# Patient Record
Sex: FEMALE | Race: BLACK OR AFRICAN AMERICAN | Employment: OTHER | URBAN - METROPOLITAN AREA
[De-identification: names, ages, dates, MRNs, and addresses within clinical notes are randomized per-mention and may not be internally consistent; named-entity substitution may affect disease eponyms.]

---

## 2017-06-19 ENCOUNTER — TELEPHONE (OUTPATIENT)
Dept: SURGERY | Age: 47
End: 2017-06-19

## 2018-02-18 ENCOUNTER — APPOINTMENT (OUTPATIENT)
Dept: GENERAL RADIOLOGY | Age: 48
End: 2018-02-18
Attending: EMERGENCY MEDICINE
Payer: MEDICAID

## 2018-02-18 ENCOUNTER — HOSPITAL ENCOUNTER (EMERGENCY)
Age: 48
Discharge: HOME | End: 2018-02-19
Attending: EMERGENCY MEDICINE
Payer: MEDICAID

## 2018-02-18 VITALS
DIASTOLIC BLOOD PRESSURE: 93 MMHG | RESPIRATION RATE: 18 BRPM | SYSTOLIC BLOOD PRESSURE: 148 MMHG | TEMPERATURE: 98.9 F | BODY MASS INDEX: 23.64 KG/M2 | WEIGHT: 156 LBS | OXYGEN SATURATION: 99 % | HEIGHT: 68 IN | HEART RATE: 94 BPM

## 2018-02-18 DIAGNOSIS — M54.10 RADICULOPATHY, UNSPECIFIED SPINAL REGION: ICD-10-CM

## 2018-02-18 DIAGNOSIS — M79.602 PAIN OF LEFT UPPER EXTREMITY: ICD-10-CM

## 2018-02-18 DIAGNOSIS — I10 HYPERTENSION, UNSPECIFIED TYPE: ICD-10-CM

## 2018-02-18 DIAGNOSIS — K21.00 GASTROESOPHAGEAL REFLUX DISEASE WITH ESOPHAGITIS: Primary | ICD-10-CM

## 2018-02-18 LAB
ALBUMIN SERPL-MCNC: 3.2 G/DL (ref 3.5–5)
ALBUMIN/GLOB SERPL: 0.9 {RATIO} (ref 1.1–2.2)
ALP SERPL-CCNC: 124 U/L (ref 45–117)
ALT SERPL-CCNC: 31 U/L (ref 12–78)
ANION GAP SERPL CALC-SCNC: 11 MMOL/L (ref 5–15)
AST SERPL-CCNC: 23 U/L (ref 15–37)
BASOPHILS # BLD: 0 K/UL (ref 0–0.1)
BASOPHILS NFR BLD: 0 % (ref 0–1)
BILIRUB SERPL-MCNC: 0.2 MG/DL (ref 0.2–1)
BUN SERPL-MCNC: 9 MG/DL (ref 6–20)
BUN/CREAT SERPL: 11 (ref 12–20)
CALCIUM SERPL-MCNC: 8.6 MG/DL (ref 8.5–10.1)
CHLORIDE SERPL-SCNC: 108 MMOL/L (ref 97–108)
CO2 SERPL-SCNC: 25 MMOL/L (ref 21–32)
CREAT SERPL-MCNC: 0.85 MG/DL (ref 0.55–1.02)
D DIMER PPP FEU-MCNC: 1.79 MG/L FEU (ref 0–0.65)
DIFFERENTIAL METHOD BLD: ABNORMAL
EOSINOPHIL # BLD: 0.3 K/UL (ref 0–0.4)
EOSINOPHIL NFR BLD: 5 % (ref 0–7)
ERYTHROCYTE [DISTWIDTH] IN BLOOD BY AUTOMATED COUNT: 13.3 % (ref 11.5–14.5)
GLOBULIN SER CALC-MCNC: 3.4 G/DL (ref 2–4)
GLUCOSE SERPL-MCNC: 123 MG/DL (ref 65–100)
HCT VFR BLD AUTO: 33.9 % (ref 35–47)
HGB BLD-MCNC: 11.2 G/DL (ref 11.5–16)
IMM GRANULOCYTES # BLD: 0 K/UL (ref 0–0.04)
IMM GRANULOCYTES NFR BLD AUTO: 0 % (ref 0–0.5)
LIPASE SERPL-CCNC: 254 U/L (ref 73–393)
LYMPHOCYTES # BLD: 1.9 K/UL (ref 0.8–3.5)
LYMPHOCYTES NFR BLD: 36 % (ref 12–49)
MCH RBC QN AUTO: 29.9 PG (ref 26–34)
MCHC RBC AUTO-ENTMCNC: 33 G/DL (ref 30–36.5)
MCV RBC AUTO: 90.4 FL (ref 80–99)
MONOCYTES # BLD: 0.5 K/UL (ref 0–1)
MONOCYTES NFR BLD: 9 % (ref 5–13)
NEUTS SEG # BLD: 2.5 K/UL (ref 1.8–8)
NEUTS SEG NFR BLD: 49 % (ref 32–75)
NRBC # BLD: 0 K/UL (ref 0–0.01)
NRBC BLD-RTO: 0 PER 100 WBC
PLATELET # BLD AUTO: 300 K/UL (ref 150–400)
PMV BLD AUTO: 9.5 FL (ref 8.9–12.9)
POTASSIUM SERPL-SCNC: 3.3 MMOL/L (ref 3.5–5.1)
PROT SERPL-MCNC: 6.6 G/DL (ref 6.4–8.2)
RBC # BLD AUTO: 3.75 M/UL (ref 3.8–5.2)
SODIUM SERPL-SCNC: 144 MMOL/L (ref 136–145)
TROPONIN I SERPL-MCNC: <0.04 NG/ML
WBC # BLD AUTO: 5.2 K/UL (ref 3.6–11)

## 2018-02-18 PROCEDURE — 84484 ASSAY OF TROPONIN QUANT: CPT | Performed by: EMERGENCY MEDICINE

## 2018-02-18 PROCEDURE — A4565 SLINGS: HCPCS

## 2018-02-18 PROCEDURE — 85025 COMPLETE CBC W/AUTO DIFF WBC: CPT | Performed by: EMERGENCY MEDICINE

## 2018-02-18 PROCEDURE — 36415 COLL VENOUS BLD VENIPUNCTURE: CPT | Performed by: EMERGENCY MEDICINE

## 2018-02-18 PROCEDURE — 93005 ELECTROCARDIOGRAM TRACING: CPT

## 2018-02-18 PROCEDURE — 74011250637 HC RX REV CODE- 250/637: Performed by: EMERGENCY MEDICINE

## 2018-02-18 PROCEDURE — 71046 X-RAY EXAM CHEST 2 VIEWS: CPT

## 2018-02-18 PROCEDURE — 99283 EMERGENCY DEPT VISIT LOW MDM: CPT

## 2018-02-18 PROCEDURE — 83690 ASSAY OF LIPASE: CPT | Performed by: EMERGENCY MEDICINE

## 2018-02-18 PROCEDURE — 80053 COMPREHEN METABOLIC PANEL: CPT | Performed by: EMERGENCY MEDICINE

## 2018-02-18 PROCEDURE — 74011000250 HC RX REV CODE- 250: Performed by: EMERGENCY MEDICINE

## 2018-02-18 PROCEDURE — 85379 FIBRIN DEGRADATION QUANT: CPT | Performed by: EMERGENCY MEDICINE

## 2018-02-18 RX ADMIN — PHENOBARBITAL ELIXIR 50 ML: 16.2; .1037; .0065; .0194 ELIXIR ORAL at 22:58

## 2018-02-19 ENCOUNTER — APPOINTMENT (OUTPATIENT)
Dept: CT IMAGING | Age: 48
End: 2018-02-19
Attending: EMERGENCY MEDICINE
Payer: MEDICAID

## 2018-02-19 PROCEDURE — 74011636320 HC RX REV CODE- 636/320: Performed by: EMERGENCY MEDICINE

## 2018-02-19 PROCEDURE — 71275 CT ANGIOGRAPHY CHEST: CPT

## 2018-02-19 RX ORDER — DEXLANSOPRAZOLE 60 MG/1
1 CAPSULE, DELAYED RELEASE ORAL DAILY
Qty: 30 CAP | Refills: 0 | Status: SHIPPED | OUTPATIENT
Start: 2018-02-19 | End: 2018-06-22 | Stop reason: SDUPTHER

## 2018-02-19 RX ORDER — TRIAMTERENE AND HYDROCHLOROTHIAZIDE 37.5; 25 MG/1; MG/1
1 CAPSULE ORAL DAILY
Qty: 30 CAP | Refills: 0 | Status: SHIPPED | OUTPATIENT
Start: 2018-02-19 | End: 2018-06-22 | Stop reason: SDUPTHER

## 2018-02-19 RX ORDER — SODIUM CHLORIDE 0.9 % (FLUSH) 0.9 %
10 SYRINGE (ML) INJECTION
Status: DISCONTINUED | OUTPATIENT
Start: 2018-02-19 | End: 2018-02-19 | Stop reason: HOSPADM

## 2018-02-19 RX ADMIN — IOPAMIDOL 100 ML: 755 INJECTION, SOLUTION INTRAVENOUS at 00:34

## 2018-02-19 NOTE — ED PROVIDER NOTES
EMERGENCY DEPARTMENT HISTORY AND PHYSICAL EXAM      Date: 2/18/2018  Patient Name: Julian Nolasco    History of Presenting Illness     Chief Complaint   Patient presents with    Arm Pain    Dizziness       History Provided By: Patient    HPI: Julian Nolasco, 49 y.o. female with PMHx significant for GERD, HTN, depression, uterine cancer (not active), bipolar disorder, Hepatitis A , presents ambulatory to the ED for further evaluation of a variation of symptoms. She is c/o LUE pain reporting that she is unable to lift her LUE x1.5 weeks. She discloses a h/o a pinched nerve in her neck which may be causing her discomfort. The pt is also c/o a subjective knot to the posterior fossa of the right knee with no trauma or injury. She notes that she has been on Lovenox for her bariatric surgery and has no h/o DVT/PE. She denies any recent travel, recent surgery but admits to being a smoker. Lastly she is experiencing epigastric abdominal burning as well. The pt reports associated sx of chest tightness and SOB as well. She expresses that her sx are consistent with reflux. The pt notes that she has been using her albuterol inhaler but has not been compliant with her home oxygen which could be contributing to her chest tightness. She denies any fevers, chills, chest pain, cough, nausea, vomiting, or diarrhea. PCP: Brittany Xiao MD    There are no other complaints, changes, or physical findings at this time. Current Outpatient Prescriptions   Medication Sig Dispense Refill    tiotropium (SPIRIVA WITH HANDIHALER) 18 mcg inhalation capsule Take 1 Cap by inhalation daily. Yasir Lance, [employer]   Indications: Constipation      ondansetron (ZOFRAN ODT) 8 mg disintegrating tablet Take 1 Tab by mouth every eight (8) hours as needed for Nausea. 30 Tab 0    ZOLPIDEM TARTRATE (AMBIEN PO) Take  by mouth.  GABAPENTIN (NEURONTIN PO) Take 300 mg by mouth two (2) times a day.       oxyCODONE IR (OXY-IR) 30 mg immediate release tablet Take 30 mg by mouth three (3) times daily.  oxymorphone (OPANA ER) 20 mg ER tablet 20 mg two (2) times a day.  MULTIVITAMINS WITH IRON (MULTI-VITAMIN WITH IRON PO) Take  by mouth.  cyanocobalamin (VITAMIN B-12) 1,000 mcg sublingual tablet Take 1,000 mcg by mouth daily.  VITAMIN D2 50,000 unit capsule Take 50,000 Units by mouth every seven (7) days.  VENTOLIN HFA 90 mcg/actuation inhaler       triamterene-hydrochlorothiazide (DYAZIDE) 37.5-25 mg per capsule Take  by mouth daily.  loratadine (CLARITIN) 10 mg tablet Take 10 mg by mouth every evening.  Dexlansoprazole (DEXILANT) 60 mg CpDM Take  by mouth.  QUEtiapine (SEROQUEL) 200 mg tablet Take 200 mg by mouth nightly. Past History     Past Medical History:  Past Medical History:   Diagnosis Date    Arthritis 10/25/2012    Bilateral chronic knee pain 10/25/2012    Bipolar 1 disorder ([facility] [address]     Bronchitis     HX BRONCHITIS    Cancer ([facility] [address]     uterine - surgery    Chronic low back pain 10/25/2012    slipped disc, herniated disc, and scoliosis per patient    Constipation 10/25/2012    Depression     Dyspepsia and other specified disorders of function of stomach     Elevated cholesterol 10/25/2012    GERD (gastroesophageal reflux disease)     Hypertension     Liver disease 1991    HEP A    Morbid obesity ([address] 10/25/2012    Musculoskeletal disorder     Psychiatric disorder     BIPOLAR    Shortness of breath on exertion 10/25/2012    Unspecified sleep apnea     HOME O2 - NC/uses oxygen maybe 3 nights a week. Pt unsure of liters. Past Surgical History:  Past Surgical History:   Procedure Laterality Date    COLONOSCOPY N/A 11/8/2016    COLONOSCOPY performed by Renea Jones.  Katja Shi MD at [facility] ENDOSCOPY    HX CHOLECYSTECTOMY      HX GASTRECTOMY  8/18/14    lap sleeve gastrectomy by dr Mis Kennedy HX GYN      fibroids removed - non cancerous - vaginal area    HX HYSTERECTOMY  11/13/15    HX TUBAL LIGATION         Family History:  Family History   Problem Relation Age of Onset    Heart Disease Mother     Psychiatric Disorder Mother     Hypertension Mother     Diabetes Brother     Heart Disease Brother     Hypertension Brother     Alzheimer Father     Other Brother      BRAIN ANEURYSM       Social History:  Social History   Substance Use Topics    Smoking status: Current Every Day Smoker     Packs/day: 1.00     Years: 20.00     Types: Cigarettes    Smokeless tobacco: Never Used    Alcohol use 0.0 oz/week     0 Standard drinks or equivalent per week      Comment: 1-2 DRINKS PER YEAR       Allergies: Allergies   Allergen Reactions    Codeine Swelling     Tongue swells; Patient has tolerated Percocet and Morphine without adverse effects  Oxymorphone and oxycodone are home meds    Compazine [Prochlorperazine Edisylate] Swelling     Tongue swells    Lisinopril Swelling     Tongue swells      Peanut Nausea and Vomiting     PEANUTS IN SHELL - SEVERE N&V         Review of Systems   Review of Systems   Constitutional: Negative. Negative for chills, fever and unexpected weight change. HENT: Negative. Negative for congestion and trouble swallowing. Eyes: Negative for discharge. Respiratory: Positive for chest tightness and shortness of breath. Negative for cough. Cardiovascular: Negative. Negative for chest pain. Gastrointestinal: Positive for abdominal pain (epigastric burning ). Negative for abdominal distention, constipation, diarrhea and nausea. Endocrine: Negative. Genitourinary: Negative. Negative for difficulty urinating, dysuria, frequency and urgency. Musculoskeletal: Positive for arthralgias (LUE ). Negative for myalgias. Skin: Negative. Negative for color change. (+) knot back of right knee    Allergic/Immunologic: Negative. Neurological: Negative. Negative for dizziness, speech difficulty and headaches.    Hematological: Negative. Psychiatric/Behavioral: Negative. Negative for agitation and confusion. All other systems reviewed and are negative. Physical Exam   Physical Exam   Constitutional: She is oriented to person, place, and time. She appears well-developed and well-nourished. No distress. Well appearing    HENT:   Head: Normocephalic and atraumatic. Eyes: Conjunctivae and EOM are normal.   Neck: Neck supple. Cardiovascular: Normal rate, regular rhythm and intact distal pulses. Pulmonary/Chest: Effort normal. No respiratory distress. Abdominal: Soft. There is no tenderness. No epigastric TTP    Musculoskeletal: Normal range of motion. She exhibits no deformity. Posterior fossa BL normal  Pain on passive abduction of the LUE   Left paraspinal cervical muscle TTP    Neurological: She is alert and oriented to person, place, and time. Skin: Skin is warm and dry. Psychiatric: She has a normal mood and affect. Her behavior is normal. Thought content normal.   Vitals reviewed.         Diagnostic Study Results     Labs -     Recent Results (from the past 12 hour(s))   EKG, 12 LEAD, INITIAL    Collection Time: 02/18/18 10:34 PM   Result Value Ref Range    Ventricular Rate 78 BPM    Atrial Rate 78 BPM    P-R Interval 136 ms    QRS Duration 78 ms    Q-T Interval 388 ms    QTC Calculation (Bezet) 442 ms    Calculated P Axis 45 degrees    Calculated R Axis 69 degrees    Calculated T Axis 47 degrees    Diagnosis       Normal sinus rhythm  Normal ECG  When compared with ECG of 16-FEB-2015 15:26,  Nonspecific T wave abnormality no longer evident in Inferior leads     TROPONIN I    Collection Time: 02/18/18 11:08 PM   Result Value Ref Range    Troponin-I, Qt. <0.04 <0.05 ng/mL   D DIMER    Collection Time: 02/18/18 11:08 PM   Result Value Ref Range    D-dimer 1.79 (H) 0.00 - 0.65 mg/L FEU   CBC WITH AUTOMATED DIFF    Collection Time: 02/18/18 11:08 PM   Result Value Ref Range    WBC 5.2 3.6 - 11.0 K/uL    RBC 3.75 (L) 3.80 - 5.20 M/uL    HGB 11.2 (L) 11.5 - 16.0 g/dL    HCT 33.9 (L) 35.0 - 47.0 %    MCV 90.4 80.0 - 99.0 FL    MCH 29.9 26.0 - 34.0 PG    MCHC 33.0 30.0 - 36.5 g/dL    RDW 13.3 11.5 - 14.5 %    PLATELET 601 515 - 753 K/uL    MPV 9.5 8.9 - 12.9 FL    NRBC 0.0 0  WBC    ABSOLUTE NRBC 0.00 0.00 - 0.01 K/uL    NEUTROPHILS 49 32 - 75 %    LYMPHOCYTES 36 12 - 49 %    MONOCYTES 9 5 - 13 %    EOSINOPHILS 5 0 - 7 %    BASOPHILS 0 0 - 1 %    IMMATURE GRANULOCYTES 0 0.0 - 0.5 %    ABS. NEUTROPHILS 2.5 1.8 - 8.0 K/UL    ABS. LYMPHOCYTES 1.9 0.8 - 3.5 K/UL    ABS. MONOCYTES 0.5 0.0 - 1.0 K/UL    ABS. EOSINOPHILS 0.3 0.0 - 0.4 K/UL    ABS. BASOPHILS 0.0 0.0 - 0.1 K/UL    ABS. IMM. GRANS. 0.0 0.00 - 0.04 K/UL    DF AUTOMATED     LIPASE    Collection Time: 02/18/18 11:08 PM   Result Value Ref Range    Lipase 254 73 - 896 U/L   METABOLIC PANEL, COMPREHENSIVE    Collection Time: 02/18/18 11:08 PM   Result Value Ref Range    Sodium 144 136 - 145 mmol/L    Potassium 3.3 (L) 3.5 - 5.1 mmol/L    Chloride 108 97 - 108 mmol/L    CO2 25 21 - 32 mmol/L    Anion gap 11 5 - 15 mmol/L    Glucose 123 (H) 65 - 100 mg/dL    BUN 9 6 - 20 MG/DL    Creatinine 0.85 0.55 - 1.02 MG/DL    BUN/Creatinine ratio 11 (L) 12 - 20      GFR est AA >60 >60 ml/min/1.73m2    GFR est non-AA >60 >60 ml/min/1.73m2    Calcium 8.6 8.5 - 10.1 MG/DL    Bilirubin, total 0.2 0.2 - 1.0 MG/DL    ALT (SGPT) 31 12 - 78 U/L    AST (SGOT) 23 15 - 37 U/L    Alk. phosphatase 124 (H) 45 - 117 U/L    Protein, total 6.6 6.4 - 8.2 g/dL    Albumin 3.2 (L) 3.5 - 5.0 g/dL    Globulin 3.4 2.0 - 4.0 g/dL    A-G Ratio 0.9 (L) 1.1 - 2.2         Radiologic Studies -     CT Results  (Last 48 hours)               02/19/18 0034  CTA CHEST W OR W WO CONT Final result    Impression:  IMPRESSION:   No evidence of acute pulmonary embolus. Mild distal esophageal wall thickening   may indicate esophagitis. Narrative:  INDICATION: Acute chest pain with shortness of breath and dizziness. COMPARISON:None       TECHNIQUE:     Routine noncontrast imaging the chest was performed for localization purposes. Then, following the uneventful intravenous administration of 100 cc [id number],   thin helical axial images were obtained through the chest. 3D image   postprocessing was performed. CT dose reduction was achieved through use of a   standardized protocol tailored for this examination and automatic exposure   control for dose modulation. FINDINGS:       THYROID: No nodule. MEDIASTINUM: No mass or lymphadenopathy. KIESHA: No mass or lymphadenopathy. THORACIC AORTA: No dissection or aneurysm. PULMONARY ARTERIES: Main pulmonary artery is normal in caliber. No evidence of   acute pulmonary emboli. TRACHEA/BRONCHI: Patent. ESOPHAGUS: Mild distal esophageal wall thickening. HEART: Normal in size. PLEURA: No effusion or pneumothorax. LUNGS: No nodule, mass, or airspace disease. INCIDENTALLY IMAGED UPPER ABDOMEN: Status post sleeve gastrectomy. BONES: No destructive bone lesion. ADDITIONAL COMMENTS: N/A               CXR Results  (Last 48 hours)               02/18/18 2244  XR CHEST PA LAT Final result    Impression:  IMPRESSION: Normal chest.           Narrative:  INDICATION: Chest pain       COMPARISON: August 4, 2014       FINDINGS: PA and lateral views of the chest demonstrate a stable   cardiomediastinal silhouette and clear lungs bilaterally. The visualized osseous   structures are unremarkable. Medical Decision Making   I am the first provider for this patient. I reviewed the vital signs, available nursing notes, past medical history, past surgical history, family history and social history. Vital Signs-Reviewed the patient's vital signs.   Patient Vitals for the past 12 hrs:   Temp Pulse Resp BP SpO2   02/18/18 2213 - - - (!) 148/93 -   02/18/18 2211 98.9 °F (37.2 °C) 94 18 (!) 158/113 99 %       Pulse Oximetry Analysis - 99% on room air    Cardiac Monitor:   Rate: 94 bpm  Rhythm: Normal Sinus Rhythm      EKG interpretation: (Preliminary)  2234  Rhythm: normal sinus rhythm; and regular . Rate (approx.): 78; Axis: normal; CO interval: normal; QRS interval: normal ; ST/T wave: normal; Other findings: normal.  Written by Sandi Montes ED Scribe, as dictated by Jenifer Valladares MD.    Records Reviewed: Nursing Notes, Old Medical Records, Previous Radiology Studies and Previous Laboratory Studies    Provider Notes (Medical Decision Making):     DDx: Musculoskeletal left shoulder pain, cervical radiculopathy, reflux, gastritis. Will rule out ACS and send D-dimer given concern for blood clot. ED Course:   Initial assessment performed. The patients presenting problems have been discussed, and they are in agreement with the care plan formulated and outlined with them. I have encouraged them to ask questions as they arise throughout their visit. Progress Notes:    Tobacco Counseling  Discussed the risks of smoking and the benefits of smoking cessation as well as the long term sequelae of smoking with the patient. The patient verbalized their understanding. Critical Care Time: 0 minutes    Disposition:  Discharge Note:  1:25 AM  The patient is ready for discharge. The patient's signs, symptoms, diagnosis, and discharge instruction have been discussed and the patient has conveyed their understanding. The patient is to follow up as recommended or return to the ER should their symptoms worsen. Plan has been discussed and the patient is in agreement. Written by Gloria Garcia ED Scribe, as dictated by Jenifer Valladares MD    PLAN:  1. Current Discharge Medication List      CONTINUE these medications which have CHANGED    Details   triamterene-hydroCHLOROthiazide (DYAZIDE) 37.5-25 mg per capsule Take 1 Cap by mouth daily. Qty: 30 Cap, Refills: 0      Dexlansoprazole (DEXILANT) 60 mg CpDB Take 1 Cap by mouth daily.   Qty: 30 Cap, Refills: 0 2.   Follow-up Information     Follow up With Details Comments William Bennett MD Schedule an appointment as soon as possible for a visit  [place] [id number]  [phone]      [facility] [place], [place] EMERGENCY DEPT  As needed, If symptoms worsen [address]  [phone]        Return to ED if worse     Diagnosis     Clinical Impression:   1. Gastroesophageal reflux disease with esophagitis    2. Radiculopathy, unspecified spinal region    3. Hypertension, unspecified type    4. Pain of left upper extremity        Attestations:    Attestation: This note is prepared by Clotilde Garcia, acting as Scribe for Jenifer Valladares MD.      Jenifer Valladares MD: The scribe's documentation has been prepared under my direction and personally reviewed by me in its entirety. I confirm that the note above accurately reflects all work, treatment, procedures, and medical decision making performed by me.

## 2018-02-19 NOTE — ED TRIAGE NOTES
C/o 1 episode of chest pain, SOB, dizziness, with LUE \"heaviness\" lasting 1 min or so 1.5 weeks or so ago  Also c/o worsening GERD where \"i spit up acid\" x 3 weeks  Also c/o RLE \"knot on the inside\" starting today

## 2018-02-19 NOTE — ED NOTES
Discharge instructions were given to the patient by Tc Bishop RN and provider. The patient left the Emergency Department ambulatory, alert and oriented and in no acute distress with 2 prescriptions. The patient was encouraged to call or return to the ED for worsening issues or problems and was encouraged to schedule a follow up appointment for continuing care. The patient verbalized understanding of discharge instructions and prescriptions, all questions were answered. The patient has no further concerns at this time.

## 2018-02-20 LAB
ATRIAL RATE: 78 BPM
CALCULATED P AXIS, ECG09: 45 DEGREES
CALCULATED R AXIS, ECG10: 69 DEGREES
CALCULATED T AXIS, ECG11: 47 DEGREES
DIAGNOSIS, 93000: NORMAL
P-R INTERVAL, ECG05: 136 MS
Q-T INTERVAL, ECG07: 388 MS
QRS DURATION, ECG06: 78 MS
QTC CALCULATION (BEZET), ECG08: 442 MS
VENTRICULAR RATE, ECG03: 78 BPM

## 2018-06-18 RX ORDER — IBUPROFEN 800 MG/1
800 TABLET ORAL
COMMUNITY
End: 2018-06-19

## 2018-06-19 ENCOUNTER — TELEPHONE (OUTPATIENT)
Dept: SURGERY | Age: 48
End: 2018-06-19

## 2018-06-19 ENCOUNTER — ANESTHESIA (OUTPATIENT)
Dept: ENDOSCOPY | Age: 48
End: 2018-06-19
Payer: MEDICAID

## 2018-06-19 ENCOUNTER — HOSPITAL ENCOUNTER (OUTPATIENT)
Age: 48
Setting detail: OUTPATIENT SURGERY
Discharge: HOME | End: 2018-06-19
Attending: INTERNAL MEDICINE | Admitting: INTERNAL MEDICINE
Payer: MEDICAID

## 2018-06-19 ENCOUNTER — ANESTHESIA EVENT (OUTPATIENT)
Dept: ENDOSCOPY | Age: 48
End: 2018-06-19
Payer: MEDICAID

## 2018-06-19 VITALS
RESPIRATION RATE: 15 BRPM | HEART RATE: 68 BPM | DIASTOLIC BLOOD PRESSURE: 84 MMHG | HEIGHT: 68 IN | WEIGHT: 156 LBS | SYSTOLIC BLOOD PRESSURE: 128 MMHG | OXYGEN SATURATION: 100 % | BODY MASS INDEX: 23.64 KG/M2 | TEMPERATURE: 98.5 F

## 2018-06-19 PROCEDURE — 77030009426 HC FCPS BIOP ENDOSC BSC -B: Performed by: INTERNAL MEDICINE

## 2018-06-19 PROCEDURE — 74011250636 HC RX REV CODE- 250/636

## 2018-06-19 PROCEDURE — 76060000031 HC ANESTHESIA FIRST 0.5 HR: Performed by: INTERNAL MEDICINE

## 2018-06-19 PROCEDURE — 88305 TISSUE EXAM BY PATHOLOGIST: CPT | Performed by: INTERNAL MEDICINE

## 2018-06-19 PROCEDURE — 77030027957 HC TBNG IRR ENDOGTR BUSS -B: Performed by: INTERNAL MEDICINE

## 2018-06-19 PROCEDURE — 88342 IMHCHEM/IMCYTCHM 1ST ANTB: CPT | Performed by: INTERNAL MEDICINE

## 2018-06-19 PROCEDURE — 76040000019: Performed by: INTERNAL MEDICINE

## 2018-06-19 PROCEDURE — 74011250637 HC RX REV CODE- 250/637: Performed by: INTERNAL MEDICINE

## 2018-06-19 PROCEDURE — 74011000250 HC RX REV CODE- 250

## 2018-06-19 RX ORDER — NALOXONE HYDROCHLORIDE 0.4 MG/ML
0.4 INJECTION, SOLUTION INTRAMUSCULAR; INTRAVENOUS; SUBCUTANEOUS
Status: DISCONTINUED | OUTPATIENT
Start: 2018-06-19 | End: 2018-06-19 | Stop reason: HOSPADM

## 2018-06-19 RX ORDER — DEXTROMETHORPHAN/PSEUDOEPHED 2.5-7.5/.8
1.2 DROPS ORAL
Status: DISCONTINUED | OUTPATIENT
Start: 2018-06-19 | End: 2018-06-19 | Stop reason: HOSPADM

## 2018-06-19 RX ORDER — EPINEPHRINE 0.1 MG/ML
1 INJECTION INTRACARDIAC; INTRAVENOUS
Status: DISCONTINUED | OUTPATIENT
Start: 2018-06-19 | End: 2018-06-19 | Stop reason: HOSPADM

## 2018-06-19 RX ORDER — DIPHENHYDRAMINE HYDROCHLORIDE 50 MG/ML
50 INJECTION, SOLUTION INTRAMUSCULAR; INTRAVENOUS ONCE
Status: DISCONTINUED | OUTPATIENT
Start: 2018-06-19 | End: 2018-06-19 | Stop reason: HOSPADM

## 2018-06-19 RX ORDER — SODIUM CHLORIDE 0.9 % (FLUSH) 0.9 %
5-10 SYRINGE (ML) INJECTION EVERY 8 HOURS
Status: DISCONTINUED | OUTPATIENT
Start: 2018-06-19 | End: 2018-06-19 | Stop reason: HOSPADM

## 2018-06-19 RX ORDER — SODIUM CHLORIDE 9 MG/ML
INJECTION, SOLUTION INTRAVENOUS
Status: DISCONTINUED | OUTPATIENT
Start: 2018-06-19 | End: 2018-06-19 | Stop reason: HOSPADM

## 2018-06-19 RX ORDER — MIDAZOLAM HYDROCHLORIDE 1 MG/ML
.25-1 INJECTION, SOLUTION INTRAMUSCULAR; INTRAVENOUS
Status: DISCONTINUED | OUTPATIENT
Start: 2018-06-19 | End: 2018-06-19 | Stop reason: HOSPADM

## 2018-06-19 RX ORDER — SODIUM CHLORIDE 9 MG/ML
100 INJECTION, SOLUTION INTRAVENOUS CONTINUOUS
Status: DISCONTINUED | OUTPATIENT
Start: 2018-06-19 | End: 2018-06-19 | Stop reason: HOSPADM

## 2018-06-19 RX ORDER — FLUMAZENIL 0.1 MG/ML
0.2 INJECTION INTRAVENOUS
Status: DISCONTINUED | OUTPATIENT
Start: 2018-06-19 | End: 2018-06-19 | Stop reason: HOSPADM

## 2018-06-19 RX ORDER — PROPOFOL 10 MG/ML
INJECTION, EMULSION INTRAVENOUS AS NEEDED
Status: DISCONTINUED | OUTPATIENT
Start: 2018-06-19 | End: 2018-06-19 | Stop reason: HOSPADM

## 2018-06-19 RX ORDER — LIDOCAINE HYDROCHLORIDE 20 MG/ML
INJECTION, SOLUTION EPIDURAL; INFILTRATION; INTRACAUDAL; PERINEURAL AS NEEDED
Status: DISCONTINUED | OUTPATIENT
Start: 2018-06-19 | End: 2018-06-19 | Stop reason: HOSPADM

## 2018-06-19 RX ORDER — ATROPINE SULFATE 0.1 MG/ML
0.5 INJECTION INTRAVENOUS
Status: DISCONTINUED | OUTPATIENT
Start: 2018-06-19 | End: 2018-06-19 | Stop reason: HOSPADM

## 2018-06-19 RX ORDER — FENTANYL CITRATE 50 UG/ML
100 INJECTION, SOLUTION INTRAMUSCULAR; INTRAVENOUS
Status: DISCONTINUED | OUTPATIENT
Start: 2018-06-19 | End: 2018-06-19 | Stop reason: HOSPADM

## 2018-06-19 RX ORDER — SODIUM CHLORIDE 0.9 % (FLUSH) 0.9 %
5-10 SYRINGE (ML) INJECTION AS NEEDED
Status: DISCONTINUED | OUTPATIENT
Start: 2018-06-19 | End: 2018-06-19 | Stop reason: HOSPADM

## 2018-06-19 RX ADMIN — PROPOFOL 50 MG: 10 INJECTION, EMULSION INTRAVENOUS at 15:47

## 2018-06-19 RX ADMIN — SODIUM CHLORIDE: 9 INJECTION, SOLUTION INTRAVENOUS at 15:30

## 2018-06-19 RX ADMIN — PROPOFOL 50 MG: 10 INJECTION, EMULSION INTRAVENOUS at 15:42

## 2018-06-19 RX ADMIN — PROPOFOL 50 MG: 10 INJECTION, EMULSION INTRAVENOUS at 15:52

## 2018-06-19 RX ADMIN — LIDOCAINE HYDROCHLORIDE 40 MG: 20 INJECTION, SOLUTION EPIDURAL; INFILTRATION; INTRACAUDAL; PERINEURAL at 15:40

## 2018-06-19 RX ADMIN — PROPOFOL 50 MG: 10 INJECTION, EMULSION INTRAVENOUS at 15:45

## 2018-06-19 RX ADMIN — PROPOFOL 50 MG: 10 INJECTION, EMULSION INTRAVENOUS at 15:40

## 2018-06-19 RX ADMIN — PROPOFOL 50 MG: 10 INJECTION, EMULSION INTRAVENOUS at 15:54

## 2018-06-19 RX ADMIN — PROPOFOL 50 MG: 10 INJECTION, EMULSION INTRAVENOUS at 16:00

## 2018-06-19 RX ADMIN — PROPOFOL 50 MG: 10 INJECTION, EMULSION INTRAVENOUS at 15:57

## 2018-06-19 RX ADMIN — PROPOFOL 50 MG: 10 INJECTION, EMULSION INTRAVENOUS at 15:49

## 2018-06-19 RX ADMIN — PROPOFOL 50 MG: 10 INJECTION, EMULSION INTRAVENOUS at 16:02

## 2018-06-19 NOTE — H&P
[address], [address] [place] [address]      History and Physical       NAME:  Jeff Cobb   :   1970   MRN:   [id number]             History of Present Illness:  Patient is a 49 y.o. who is seen for dysphagia, acid reflux, and history of sleeve gastrectomy. First attempt at colonoscopy was limited by inadequate preparation. PMH:  Past Medical History:   Diagnosis Date    Arthritis 10/25/2012    Bilateral chronic knee pain 10/25/2012    Bipolar 1 disorder (HCC)     Bronchitis     HX BRONCHITIS    Cancer ([facility] [address]     uterine - surgery    Chronic low back pain 10/25/2012    slipped disc, herniated disc, and scoliosis per patient    Constipation 10/25/2012    Depression     Dyspepsia and other specified disorders of function of stomach     Elevated cholesterol 10/25/2012    GERD (gastroesophageal reflux disease)     Hypertension     Liver disease     HEP A    Morbid obesity ([facility] [address] 10/25/2012    Musculoskeletal disorder     Psychiatric disorder     BIPOLAR    Shortness of breath on exertion 10/25/2012    Unspecified sleep apnea     HOME O2 - NC/uses oxygen maybe 3 nights a week. Pt unsure of liters. PSH:  Past Surgical History:   Procedure Laterality Date    COLONOSCOPY N/A 2016    COLONOSCOPY performed by Yolanda Bartlett. Brynn Pool MD at [facility] ENDOSCOPY    HX CHOLECYSTECTOMY      HX GASTRECTOMY  14    lap sleeve gastrectomy by dr Ellen Perea HX GYN      fibroids removed - non cancerous - vaginal area    HX HYSTERECTOMY  11/13/15    HX TUBAL LIGATION         Allergies: Allergies   Allergen Reactions    Codeine Swelling     Tongue swells;  Patient has tolerated Percocet and Morphine without adverse effects  Oxymorphone and oxycodone are home meds    Ambien [Zolpidem] Other (comments)     \"Sleep driving\"    Compazine [Prochlorperazine Edisylate] Swelling     Tongue swells    Lisinopril Swelling     Tongue swells      Peanut Nausea and Vomiting     PEANUTS IN SHELL - SEVERE N&V       Home Medications:  Prior to Admission Medications   Prescriptions Last Dose Informant Patient Reported? Taking? Dexlansoprazole (DEXILANT) 60 mg CpDB   No No   Sig: Take 1 Cap by mouth daily. GABAPENTIN (NEURONTIN PO)   Yes No   Sig: Take 300 mg by mouth two (2) times a day. MULTIVITAMINS WITH IRON (MULTI-VITAMIN WITH IRON PO)   Yes No   Sig: Take  by mouth. OTHER,NON-FORMULARY,   Yes No   Sig: [employer]   Indications: Constipation   QUEtiapine (SEROQUEL) 200 mg tablet   Yes No   Sig: Take 200 mg by mouth nightly. VENTOLIN HFA 90 mcg/actuation inhaler   Yes No   VITAMIN D2 50,000 unit capsule   Yes No   Sig: Take 50,000 Units by mouth every seven (7) days. ibuprofen (MOTRIN) 800 mg tablet 2018  Yes Yes   Sig: Take 800 mg by mouth three (3) times daily as needed for Pain.   loratadine (CLARITIN) 10 mg tablet   Yes No   Sig: Take 10 mg by mouth every evening. oxyCODONE IR (OXY-IR) 30 mg immediate release tablet   Yes No   Sig: Take 30 mg by mouth three (3) times daily. oxymorphone (OPANA ER) 20 mg ER tablet   Yes No   Si mg two (2) times a day. tiotropium (SPIRIVA WITH HANDIHALER) 18 mcg inhalation capsule   Yes No   Sig: Take 1 Cap by inhalation daily. triamterene-hydroCHLOROthiazide (DYAZIDE) 37.5-25 mg per capsule   No No   Sig: Take 1 Cap by mouth daily.       Facility-Administered Medications: None       Hospital Medications:  Current Facility-Administered Medications   Medication Dose Route Frequency    0.9% sodium chloride infusion  100 mL/hr IntraVENous CONTINUOUS    sodium chloride (NS) flush 5-10 mL  5-10 mL IntraVENous Q8H    sodium chloride (NS) flush 5-10 mL  5-10 mL IntraVENous PRN    midazolam (VERSED) injection 0.25-10 mg  0.25-10 mg IntraVENous Multiple    fentaNYL citrate (PF) injection 100 mcg  100 mcg IntraVENous Multiple    naloxone (NARCAN) injection 0.4 mg  0.4 mg IntraVENous Multiple    flumazenil (ROMAZICON) 0.1 mg/mL injection 0.2 mg  0.2 mg IntraVENous Multiple    simethicone (MYLICON) 54TB/9.6QY oral drops 80 mg  1.2 mL Oral Multiple    diphenhydrAMINE (BENADRYL) injection 50 mg  50 mg IntraVENous ONCE    atropine injection 0.5 mg  0.5 mg IntraVENous ONCE PRN    EPINEPHrine (ADRENALIN) 0.1 mg/mL syringe 1 mg  1 mg Endoscopically ONCE PRN       Social History:  Social History   Substance Use Topics    Smoking status: Current Every Day Smoker     Packs/day: 1.00     Years: 20.00     Types: Cigarettes    Smokeless tobacco: Never Used    Alcohol use 0.0 oz/week     0 Standard drinks or equivalent per week      Comment: 1-2 DRINKS PER YEAR       Family History:  Family History   Problem Relation Age of Onset    Heart Disease Mother     Psychiatric Disorder Mother     Hypertension Mother     Diabetes Brother     Heart Disease Brother     Hypertension Brother     Alzheimer Father     Other Brother      BRAIN ANEURYSM             Review of Systems:      Constitutional: negative fever, negative chills, negative weight loss  Eyes:   negative visual changes  ENT:   negative sore throat, tongue or lip swelling  Respiratory:  negative cough, negative dyspnea  Cards:  negative for chest pain, palpitations, lower extremity edema  GI:   See HPI  :  negative for frequency, dysuria  Integument:  negative for rash and pruritus  Heme:  negative for easy bruising and gum/nose bleeding  Musculoskel: negative for myalgias,  back pain and muscle weakness  Neuro: negative for headaches, dizziness, vertigo  Psych:  negative for feelings of anxiety, depression       Objective:   No data found. EXAM:     NEURO-a&o   HEENT-wnl   LUNGS-clear    COR-regular rate and rhythym     ABD-soft , no tenderness, no rebound, good bs     EXT-no edema     Data Review     No results for input(s): WBC, HGB, HCT, PLT, HGBEXT, HCTEXT, PLTEXT in the last 72 hours.   No results for input(s): NA, K, CL, CO2, BUN, CREA, GLU, PHOS, CA in the last 72 hours. No results for input(s): SGOT, GPT, AP, TBIL, TP, ALB, GLOB, GGT, AML, LPSE in the last 72 hours. No lab exists for component: AMYP, HLPSE  No results for input(s): INR, PTP, APTT in the last 72 hours. No lab exists for component: INREXT       Assessment:   · Dysphagia  · Acid reflux  · Colon cancer screening     Patient Active Problem List   Diagnosis Code    Morbid obesity ([facility] [address] E66.01    Hypertension I10    Depression F32.9    GERD (gastroesophageal reflux disease) K21.9    Arthritis M19.90    Chronic low back pain M54.5, G89.29    Bilateral chronic knee pain M25.561, M25.562, G89.29    Elevated cholesterol E78.00    Shortness of breath on exertion R06.02    Constipation K59.00     Plan:   · Endoscopic procedure with MAC     Signed By: Georgette Moreno.  Nimo Loya MD     6/19/2018  3:27 PM

## 2018-06-19 NOTE — IP AVS SNAPSHOT
[address] 
[phone] Patient: Jak Carson MRN: [id number] :1970 About your hospitalization You were admitted on:  2018 You last received care in the:  [facility] ENDOSCOPY You were discharged on:  2018 Why you were hospitalized Your primary diagnosis was:  Not on File Follow-up Information None Discharge Orders None A check young indicates which time of day the medication should be taken. My Medications CONTINUE taking these medications Instructions Each Dose to Equal  
 Morning Noon Evening Bedtime CLARITIN 10 mg tablet Generic drug:  loratadine Your last dose was: Your next dose is: Take 10 mg by mouth every evening. 10 mg Dexlansoprazole 60 mg Cpdb Commonly known as:  DEXILANT Your last dose was: Your next dose is: Take 1 Cap by mouth daily. 1 Cap MULTI-VITAMIN WITH IRON PO Your last dose was: Your next dose is: Take  by mouth. NEURONTIN PO Your last dose was: Your next dose is: Take 300 mg by mouth two (2) times a day. 300 mg  
    
   
   
   
  
 OTHER(NON-FORMULARY) Your last dose was: Your next dose is:    
   
   
 [place]   Indications: Constipation  
     
   
   
   
  
 oxyCODONE IR 30 mg immediate release tablet Commonly known as:  Twylla Leaks Your last dose was: Your next dose is: Take 30 mg by mouth three (3) times daily. 30 mg  
    
   
   
   
  
 oxyMORphone 20 mg ER tablet Commonly known as:  OPANA ER Your last dose was: Your next dose is:    
   
   
 20 mg two (2) times a day. 20 mg  
    
   
   
   
  
 SEROquel 200 mg tablet Generic drug:  QUEtiapine Your last dose was: Your next dose is: Take 200 mg by mouth nightly. 200 mg SPIRIVA WITH HANDIHALER 18 mcg inhalation capsule Generic drug:  tiotropium Your last dose was: Your next dose is: Take 1 Cap by inhalation daily. 1 Cap  
    
   
   
   
  
 triamterene-hydroCHLOROthiazide 37.5-25 mg per capsule Commonly known as:  Bryant Christi Your last dose was: Your next dose is: Take 1 Cap by mouth daily. 1 Cap VENTOLIN HFA 90 mcg/actuation inhaler Generic drug:  albuterol Your last dose was: Your next dose is: VITAMIN D2 50,000 unit capsule Generic drug:  ergocalciferol Your last dose was: Your next dose is: Take 50,000 Units by mouth every seven (7) days. 74588 Units STOP taking these medications   
 ibuprofen 800 mg tablet Commonly known as:  MOTRIN Opioid Education Prescription Opioids: What You Need to Know: 
 
Prescription opioids can be used to help relieve moderate-to-severe pain and are often prescribed following a surgery or injury, or for certain health conditions. These medications can be an important part of treatment but also come with serious risks. Opioids are strong pain medicines. Examples include hydrocodone, oxycodone, fentanyl, and morphine. Heroin is an example of an illegal opioid. It is important to work with your health care provider to make sure you are getting the safest, most effective care. WHAT ARE THE RISKS AND SIDE EFFECTS OF OPIOID USE? Prescription opioids carry serious risks of addiction and overdose, especially with prolonged use. An opioid overdose, often marked by slow breathing, can cause sudden death. The use of prescription opioids can have a number of side effects as well, even when taken as directed.  
  
· Tolerance-meaning you might need to take more of a medication for the same pain relief · Physical dependence-meaning you have symptoms of withdrawal when the medication is stopped. Withdrawal symptoms can include nausea, sweating, chills, diarrhea, stomach cramps, and muscle aches. Withdrawal can last up to several weeks, depending on which drug you took and how long you took it. · Increased sensitivity to pain · Constipation · Nausea, vomiting, and dry mouth · Sleepiness and dizziness · Confusion · Depression · Low levels of testosterone that can result in lower sex drive, energy, and strength · Itching and sweating RISKS ARE GREATER WITH:      
· History of drug misuse, substance use disorder, or overdose · Mental health conditions (such as depression or anxiety) · Sleep apnea · Older age (72 years or older) · Pregnancy Avoid alcohol while taking prescription opioids. Also, unless specifically advised by your health care provider, medications to avoid include: · Benzodiazepines (such as Xanax or Valium) · Muscle relaxants (such as Soma or Flexeril) · Hypnotics (such as Ambien or Lunesta) · Other prescription opioids KNOW YOUR OPTIONS Talk to your health care provider about ways to manage your pain that don't involve prescription opioids. Some of these options may actually work better and have fewer risks and side effects. Options may include: 
· Pain relievers such as acetaminophen, ibuprofen, and naproxen · Some medications that are also used for depression or seizures · Physical therapy and exercise · Counseling to help patients learn how to cope better with triggers of pain and stress. · Application of heat or cold compress · Massage therapy · Relaxation techniques Be Informed Make sure you know the name of your medication, how much and how often to take it, and its potential risks & side effects.  
 
IF YOU ARE PRESCRIBED OPIOIDS FOR PAIN: 
· Never take opioids in greater amounts or more often than prescribed. Remember the goal is not to be pain-free but to manage your pain at a tolerable level. · Follow up with your primary care provider to: · Work together to create a plan on how to manage your pain. · Talk about ways to help manage your pain that don't involve prescription opioids. · Talk about any and all concerns and side effects. · Help prevent misuse and abuse. · Never sell or share prescription opioids · Help prevent misuse and abuse. · Store prescription opioids in a secure place and out of reach of others (this may include visitors, children, friends, and family). · Safely dispose of unused/unwanted prescription opioids: Find your community drug take-back program or your pharmacy mail-back program, or flush them down the toilet, following guidance from the Food and Drug Administration (www.fda.gov/Drugs/ResourcesForYou). · Visit www.cdc.gov/drugoverdose to learn about the risks of opioid abuse and overdose. · If you believe you may be struggling with addiction, tell your health care provider and ask for guidance or call [facility] at [phone]. Discharge Instructions [address] 
611 [facility], [address] EGD/COLON DISCHARGE INSTRUCTIONS [address] [facility],Fourth [facility] [id number] 
1970 Discomfort: 
Sore throat- throat lozenges or warm salt water gargle 
redness at IV site- apply warm compress to area; if redness or soreness persist- contact your physician Gaseous discomfort- walking, belching will help relieve any discomfort You may not operate a vehicle for 12 hours You may not engage in an occupation involving machinery or appliances for rest of today You may not drink alcoholic beverages for at least 12 hours Avoid making any critical decisions for at least 24 hour DIET You may resume your regular diet  however -  remember your colon is empty and a heavy meal will produce gas. Avoid these foods:  vegetables, fried / greasy foods, carbonated drinks ACTIVITY You may resume your normal daily activities Spend the remainder of the day resting -  avoid any strenuous activity. CALL M.D. ANY SIGN OF Increasing pain, nausea, vomiting Abdominal distension (swelling) New increased bleeding (oral or rectal) Fever (chills) Pain in chest area Bloody discharge from nose or mouth Shortness of breath Follow-up Instructions: 
 Call Dr. Emily Lozano for any questions or problems. Telephone # [phone] ENDOSCOPY FINDINGS: 
 Your endoscopy showed gastritis and esophagitis. Please discontinue ibuprofen. Please continue Dexilant. Your colonoscopy showed one polyp, which was removed. Please maintain a high fiber diet. We will contact you about the pathology results and when your next colonoscopy will be due. Signed By: China Younger. Dori Fernandez, [address] [facility] Thank you for requesting access to [employer]. Please follow the instructions below to securely access and download your online medical record. [employer] allows you to send messages to your doctor, view your test results, renew your prescriptions, schedule appointments, and more. How Do I Sign Up? 1. In your internet browser, go to [email or web address] 
2. Click on the First Time User? Click Here link in the Sign In box. You will be redirect to the New Member Sign Up page. 3. Enter your [employer] Access Code exactly as it appears below. You will not need to use this code after youve completed the sign-up process. If you do not sign up before the expiration date, you must request a new code. [employer] Access Code: [email or web address] Expires: 2018  4:27 PM (This is the date your [employer] access code will ) 4.  Enter the last four digits of your Social Security Number (xxxx) and Date of Birth (mm/dd/yyyy) as indicated and click Submit. You will be taken to the next sign-up page. 5. Create a App[employer]et ID. This will be your [employer] login ID and cannot be changed, so think of one that is secure and easy to remember. 6. Create a [employer] password. You can change your password at any time. 7. Enter your Password Reset Question and Answer. This can be used at a later time if you forget your password. 8. Enter your e-mail address. You will receive e-mail notification when new information is available in [address]. 9. Click Sign Up. You can now view and download portions of your medical record. 10. Click the Download Summary menu link to download a portable copy of your medical information. Additional Information If you have questions, please visit the Frequently Asked Questions section of the [employer] website at [email or web address] Remember, [employer] is NOT to be used for urgent needs. For medical emergencies, dial 911. Colon Polyps: Care Instructions Your Care Instructions Colon polyps are growths in the colon or the rectum. The cause of most colon polyps is not known, and most people who get them do not have any problems. But a certain kind can turn into cancer. For this reason, regular testing for colon polyps is important for people age 48 and older and anyone who has an increased risk for colon cancer. Polyps are usually found through routine colon cancer screening tests. Although most colon polyps are not cancerous, they are usually removed and then tested for cancer. Screening for colon cancer saves lives because the cancer can usually be cured if it is caught early. If you have a polyp that is the type that can turn into cancer, you may need more tests to examine your entire colon. The doctor will remove any other polyps that he or she finds, and you will be tested more often. Follow-up care is a key part of your treatment and safety.  Be sure to make and go to all appointments, and call your doctor if you are having problems. It's also a good idea to know your test results and keep a list of the medicines you take. How can you care for yourself at home? Regular exams to look for colon polyps are the best way to prevent polyps from turning into colon cancer. These can include stool tests, sigmoidoscopy, colonoscopy, and CT colonography. Talk with your doctor about a testing schedule that is right for you. To prevent polyps There is no home treatment that can prevent colon polyps. But these steps may help lower your risk for cancer. · Stay active. Being active can help you get to and stay at a healthy weight. Try to exercise on most days of the week. Walking is a good choice. · Eat well. Choose a variety of vegetables, fruits, legumes (such as peas and beans), fish, poultry, and whole grains. · Do not smoke. If you need help quitting, talk to your doctor about stop-smoking programs and medicines. These can increase your chances of quitting for good. · If you drink alcohol, limit how much you drink. Limit alcohol to 2 drinks a day for men and 1 drink a day for women. When should you call for help? Call your doctor now or seek immediate medical care if: 
? · You have severe belly pain. ? · Your stools are maroon or very bloody. ? Watch closely for changes in your health, and be sure to contact your doctor if: 
? · You have a fever. ? · You have nausea or vomiting. ? · You have a change in bowel habits (new constipation or diarrhea). ? · Your symptoms get worse or are not improving as expected. Where can you learn more? Go to http://gretchen-blair.info/. Enter 95 [id number] in the search box to learn more about \"Colon Polyps: Care Instructions. \" Current as of: May 12, 2017 Content Version: 11.4 © 7526-7467 Healthwise, Incorporated. Care instructions adapted under license by [employer] (which disclaims liability or warranty for this information).  If you have questions about a medical condition or this instruction, always ask your healthcare professional. [address] any warranty or liability for your use of this information. Diverticulosis: Care Instructions Your Care Instructions In diverticulosis, pouches called diverticula form in the wall of the large intestine (colon). The pouches do not cause any pain or other symptoms. Most people who have diverticulosis do not know they have it. But the pouches sometimes bleed, and if they become infected, they can cause pain and other symptoms. When this happens, it is called diverticulitis. Diverticula form when pressure pushes the wall of the colon outward at certain weak points. A diet that is too low in fiber can cause diverticula. Follow-up care is a key part of your treatment and safety. Be sure to make and go to all appointments, and call your doctor if you are having problems. It's also a good idea to know your test results and keep a list of the medicines you take. How can you care for yourself at home? · Include fruits, leafy green vegetables, beans, and whole grains in your diet each day. These foods are high in fiber. · Take a fiber supplement, such as Citrucel or Metamucil, every day if needed. Read and follow all instructions on the label. · Drink plenty of fluids, enough so that your urine is light yellow or clear like water. If you have kidney, heart, or liver disease and have to limit fluids, talk with your doctor before you increase the amount of fluids you drink. · Get at least 30 minutes of exercise on most days of the week. Walking is a good choice. You also may want to do other activities, such as running, swimming, cycling, or playing tennis or team sports. · Cut out foods that cause gas, pain, or other symptoms. When should you call for help? Call your doctor now or seek immediate medical care if: 
? · You have belly pain.   
? · You pass maroon or very bloody stools. ? · You have a fever. ? · You have nausea and vomiting. ? · You have unusual changes in your bowel movements or abdominal swelling. ? · You have burning pain when you urinate. ? · You have abnormal vaginal discharge. ? · You have shoulder pain. ? · You have cramping pain that does not get better when you have a bowel movement or pass gas. ? · You pass gas or stool from your urethra while urinating. ? Watch closely for changes in your health, and be sure to contact your doctor if you have any problems. Where can you learn more? Go to http://gretchen-blair.info/. Enter G548 in the search box to learn more about \"Diverticulosis: Care Instructions. \" Current as of: May 12, 2017 Content Version: 11.4 © 4082-6821 [employer]. Care instructions adapted under license by "[employer] (which disclaims liability or warranty for this information). If you have questions about a medical condition or this instruction, always ask your healthcare professional. [place] [address] any warranty or liability for your use of this information. Gastritis: Care Instructions Your Care Instructions Gastritis is a sore and upset stomach. It happens when something irritates the stomach lining. Many things can cause it. These include an infection such as the flu or something you ate or drank. Medicines or a sore on the lining of the stomach (ulcer) also can cause it. Your belly may bloat and ache. You may belch, vomit, and feel sick to your stomach. You should be able to relieve the problem by taking medicine. And it may help to change your diet. If gastritis lasts, your doctor may prescribe medicine. Follow-up care is a key part of your treatment and safety. Be sure to make and go to all appointments, and call your doctor if you are having problems. It's also a good idea to know your test results and keep a list of the medicines you take.  
How can you care for yourself at home? · If your doctor prescribed antibiotics, take them as directed. Do not stop taking them just because you feel better. You need to take the full course of antibiotics. · Be safe with medicines. If your doctor prescribed medicine to decrease stomach acid, take it as directed. Call your doctor if you think you are having a problem with your medicine. · Do not take any other medicine, including over-the-counter pain relievers, without talking to your doctor first. 
· If your doctor recommends over-the-counter medicine to reduce stomach acid, such as Pepcid AC, Prilosec, Tagamet HB, or Zantac 75, follow the directions on the label. · Drink plenty of fluids (enough so that your urine is light yellow or clear like water) to prevent dehydration. Choose water and other caffeine-free clear liquids. If you have kidney, heart, or liver disease and have to limit fluids, talk with your doctor before you increase the amount of fluids you drink. · Limit how much alcohol you drink. · Avoid coffee, tea, cola drinks, chocolate, and other foods with caffeine. They increase stomach acid. When should you call for help? Call 911 anytime you think you may need emergency care. For example, call if: 
? · You vomit blood or what looks like coffee grounds. ? · You pass maroon or very bloody stools. ?Call your doctor now or seek immediate medical care if: 
? · You start breathing fast and have not produced urine in the last 8 hours. ? · You cannot keep fluids down. ? Watch closely for changes in your health, and be sure to contact your doctor if: 
? · You do not get better as expected. Where can you learn more? Go to http://gretchen-blair.info/. Enter [id number] in the search box to learn more about \"Gastritis: Care Instructions. \" Current as of: May 12, 2017 Content Version: 11.4 © 6753-0353 [employer].  Care instructions adapted under license by [employer] (which disclaims liability or warranty for this information). If you have questions about a medical condition or this instruction, always ask your healthcare professional. [address] any warranty or liability for your use of this information. Introducing [facility]! Jessie Castrejon introduces [employer] patient portal. Now you can access parts of your medical record, email your doctor's office, and request medication refills online. 1. In your internet browser, go to [email or web address] 2. Click on the First Time User? Click Here link in the Sign In box. You will see the New Member Sign Up page. 3. Enter your [employer] Access Code exactly as it appears below. You will not need to use this code after youve completed the sign-up process. If you do not sign up before the expiration date, you must request a new code. · [employer] Access Code: [email or web address] Expires: 9/17/2018  4:27 PM 
 
4. Enter the last four digits of your Social Security Number (xxxx) and Date of Birth (mm/dd/yyyy) as indicated and click Submit. You will be taken to the next sign-up page. 5. Create a [employer] ID. This will be your [employer] login ID and cannot be changed, so think of one that is secure and easy to remember. 6. Create a [employer] password. You can change your password at any time. 7. Enter your Password Reset Question and Answer. This can be used at a later time if you forget your password. 8. Enter your e-mail address. You will receive e-mail notification when new information is available in [address] 9. Click Sign Up. You can now view and download portions of your medical record. 10. Click the Download Summary menu link to download a portable copy of your medical information. If you have questions, please visit the Frequently Asked Questions section of the [employer] website. Remember, [employer] is NOT to be used for urgent needs. For medical emergencies, dial 911. Now available from your iPhone and Android! Introducing Sammy Navarrete As a [employer] patient, I wanted to make you aware of our electronic visit tool called Sammy Navarrete. [employer] 24/7 allows you to connect within minutes with a medical provider 24 hours a day, seven days a week via a mobile device or tablet or logging into a secure website from your computer. You can access Sammy Navarrete from anywhere in the [place]. A virtual visit might be right for you when you have a simple condition and feel like you just dont want to get out of bed, or cant get away from work for an appointment, when your regular [employer] provider is not available (evenings, weekends or holidays), or when youre out of town and need minor care. Electronic visits cost only $49 and if the [employer] 24/7 provider determines a prescription is needed to treat your condition, one can be electronically transmitted to a nearby pharmacy*. Please take a moment to enroll today if you have not already done so. The enrollment process is free and takes just a few minutes. To enroll, please download the [employer] 24/7 terrie to your tablet or phone, or visit [email or web address] to enroll on your computer. And, as an [address] [place] [address] patient with a [employer] account, the results of your visits will be scanned into your electronic medical record and your primary care provider will be able to view the scanned results. We urge you to continue to see your regular [employer] provider for your ongoing medical care. And while your primary care provider may not be the one available when you seek a Sammy Navarrete virtual visit, the peace of mind you get from getting a real diagnosis real time can be priceless. For more information on Sammy Navarrete, view our Frequently Asked Questions (FAQs) at [email or web address]. Sincerely, 
 
Nolvia Painter MD 
Chief Medical Officer [facility] [address] *:  certain medications cannot be prescribed via Sammy Navarrete Providers Seen During Your Hospitalization Provider Specialty Primary office phone Bryce Cooper MD Gastroenterology [phone] Your Primary Care Physician (PCP) Primary Care Physician Office Phone Office Fax Loraine Huddleston [phone] You are allergic to the following Allergen Reactions Codeine Swelling Tongue swells; Patient has tolerated Percocet and Morphine without adverse effects Oxymorphone and oxycodone are home meds Ambien (Zolpidem) Other (comments) \"Sleep driving\" Compazine (Prochlorperazine Edisylate) Swelling Tongue swells Lisinopril Swelling Tongue swells Peanut Nausea and Vomiting PEANUTS IN SHELL - SEVERE N&V Recent Documentation Height Weight Breastfeeding? BMI OB Status Smoking Status 1.727 m 70.8 kg No 23.72 kg/m2 Hysterectomy Current Every Day Smoker Emergency Contacts Name Discharge Info Relation Home Work Mobile VandanaShayna DISCHARGE CAREGIVER [3] Daughter [21] [phone] Patient Belongings The following personal items are in your possession at time of discharge: 
  Dental Appliances: None Please provide this summary of care documentation to your next provider. Signatures-by signing, you are acknowledging that this After Visit Summary has been reviewed with you and you have received a copy. Patient Signature:  ____________________________________________________________ Date:  ____________________________________________________________  
  
Dottie Raphael Provider Signature:  ____________________________________________________________ Date:  ____________________________________________________________

## 2018-06-19 NOTE — PROCEDURES
[address], [address] [place] [address]        Colonoscopy Operative Report    Pita Phoenix  [id number]  1970      Procedure Type:   Colonoscopy with polypectomy (cold biopsy)     Indications:    Screening colonoscopy         Pre-operative Diagnosis: see indication above    Post-operative Diagnosis:  See findings below    :  sOvaldo Cloud. Sharita Melgoza MD      Referring Provider: Ravindra Lehman MD      Sedation:  MAC anesthesia Propofol      Procedure Details:  After informed consent was obtained with all risks and benefits of procedure explained and preoperative exam completed, the patient was taken to the endoscopy suite and placed in the left lateral decubitus position. Upon sequential sedation as per above, a digital rectal exam was performed demonstrating internal hemorrhoids. The Olympus pediatric videocolonoscope  was inserted in the rectum and carefully advanced to the cecum, which was identified by the ileocecal valve and appendiceal orifice. The cecum was identified by the ileocecal valve and appendiceal orifice. The quality of preparation was good. The colonoscope was slowly withdrawn with careful evaluation between folds. Retroflexion in the rectum was completed . Findings:   Rectum: small internal hemorrhoids, single sessile 3 mm polyp - removed with cold biopsy forceps  Sigmoid: mild diverticulosis  Descending Colon: a subepithelial lesion endoscopically consistent with a lipoma was seen in the proximal descending colon  Transverse Colon: normal  Ascending Colon: normal  Cecum: normal  Terminal Ileum: not intubated      Specimen Removed:  1. Rectal polyp    Complications: None. EBL:  None. Impression:     1. Rectal polyp - removed  2. Mild left-sided diverticulosis  3. Small internal hemorrhoids    Recommendations:  1. Follow up surgical pathology  2. Repeat colonoscopy in 5 years  3. High fiber diet education    Signed By: Osvaldo Cloud.  Sharita Melgoza MD 6/19/2018  4:29 PM

## 2018-06-19 NOTE — DISCHARGE INSTRUCTIONS
[address]  [facility]    EGD/COLON DISCHARGE INSTRUCTIONS    Grace Aguilar  [id number]  1970    Discomfort:  Sore throat- throat lozenges or warm salt water gargle  redness at IV site- apply warm compress to area; if redness or soreness persist- contact your physician  Gaseous discomfort- walking, belching will help relieve any discomfort  You may not operate a vehicle for 12 hours  You may not engage in an occupation involving machinery or appliances for rest of today  You may not drink alcoholic beverages for at least 12 hours  Avoid making any critical decisions for at least 24 hour  DIET  You may resume your regular diet - however -  remember your colon is empty and a heavy meal will produce gas. Avoid these foods:  vegetables, fried / greasy foods, carbonated drinks    ACTIVITY  You may resume your normal daily activities   Spend the remainder of the day resting -  avoid any strenuous activity. CALL M.D. ANY SIGN OF   Increasing pain, nausea, vomiting  Abdominal distension (swelling)  New increased bleeding (oral or rectal)  Fever (chills)  Pain in chest area  Bloody discharge from nose or mouth  Shortness of breath    Follow-up Instructions:   Call Dr. Griselda Burleson for any questions or problems. Telephone # [phone]    ENDOSCOPY FINDINGS:   Your endoscopy showed gastritis and esophagitis. Please discontinue ibuprofen. Please continue Dexilant. Your colonoscopy showed one polyp, which was removed. Please maintain a high fiber diet. We will contact you about the pathology results and when your next colonoscopy will be due. Signed By: Brooklynn Greene. Spencer Mohan MD    [facility] Activation    Thank you for requesting access to [facility]. Please follow the instructions below to securely access and download your online medical record.  [facility] allows you to send messages to your doctor, view your test results, renew your prescriptions, schedule appointments, and more. How Do I Sign Up? 1. In your internet browser, go to [email or web address]  2. Click on the First Time User? Click Here link in the Sign In box. You will be redirect to the New Member Sign Up page. 3. Enter your YouGoDo Access Code exactly as it appears below. You will not need to use this code after youve completed the sign-up process. If you do not sign up before the expiration date, you must request a new code. [employer] Access Code: [email or web address]  Expires: 2018  4:27 PM (This is the date your [employer] access code will )    4. Enter the last four digits of your Social Security Number (xxxx) and Date of Birth (mm/dd/yyyy) as indicated and click Submit. You will be taken to the next sign-up page. 5. Create a YouGoDo ID. This will be your [employer] login ID and cannot be changed, so think of one that is secure and easy to remember. 6. Create a [employer] password. You can change your password at any time. 7. Enter your Password Reset Question and Answer. This can be used at a later time if you forget your password. 8. Enter your e-mail address. You will receive e-mail notification when new information is available in [address] 9. Click Sign Up. You can now view and download portions of your medical record. 10. Click the Download Summary menu link to download a portable copy of your medical information. Additional Information    If you have questions, please visit the Frequently Asked Questions section of the YouGoDo website at [email or web address] Remember, You[employer]Do is NOT to be used for urgent needs. For medical emergencies, dial 911. Colon Polyps: Care Instructions  Your Care Instructions    Colon polyps are growths in the colon or the rectum. The cause of most colon polyps is not known, and most people who get them do not have any problems. But a certain kind can turn into cancer.  For this reason, regular testing for colon polyps is important for people age 48 and older and anyone who has an increased risk for colon cancer. Polyps are usually found through routine colon cancer screening tests. Although most colon polyps are not cancerous, they are usually removed and then tested for cancer. Screening for colon cancer saves lives because the cancer can usually be cured if it is caught early. If you have a polyp that is the type that can turn into cancer, you may need more tests to examine your entire colon. The doctor will remove any other polyps that he or she finds, and you will be tested more often. Follow-up care is a key part of your treatment and safety. Be sure to make and go to all appointments, and call your doctor if you are having problems. It's also a good idea to know your test results and keep a list of the medicines you take. How can you care for yourself at home? Regular exams to look for colon polyps are the best way to prevent polyps from turning into colon cancer. These can include stool tests, sigmoidoscopy, colonoscopy, and CT colonography. Talk with your doctor about a testing schedule that is right for you. To prevent polyps  There is no home treatment that can prevent colon polyps. But these steps may help lower your risk for cancer. · Stay active. Being active can help you get to and stay at a healthy weight. Try to exercise on most days of the week. Walking is a good choice. · Eat well. Choose a variety of vegetables, fruits, legumes (such as peas and beans), fish, poultry, and whole grains. · Do not smoke. If you need help quitting, talk to your doctor about stop-smoking programs and medicines. These can increase your chances of quitting for good. · If you drink alcohol, limit how much you drink. Limit alcohol to 2 drinks a day for men and 1 drink a day for women. When should you call for help? Call your doctor now or seek immediate medical care if:  ? · You have severe belly pain.    ? · Your stools are maroon or very bloody. ? Watch closely for changes in your health, and be sure to contact your doctor if:  ? · You have a fever. ? · You have nausea or vomiting. ? · You have a change in bowel habits (new constipation or diarrhea). ? · Your symptoms get worse or are not improving as expected. Where can you learn more? Go to http://gretchen-blair.info/. Enter 95 [id number] in the search box to learn more about \"Colon Polyps: Care Instructions. \"  Current as of: May 12, 2017  Content Version: 11.4  © 9682-4544 [employer]. Care instructions adapted under license by [employer] (which disclaims liability or warranty for this information). If you have questions about a medical condition or this instruction, always ask your healthcare professional. [address] any warranty or liability for your use of this information. Diverticulosis: Care Instructions  Your Care Instructions  In diverticulosis, pouches called diverticula form in the wall of the large intestine (colon). The pouches do not cause any pain or other symptoms. Most people who have diverticulosis do not know they have it. But the pouches sometimes bleed, and if they become infected, they can cause pain and other symptoms. When this happens, it is called diverticulitis. Diverticula form when pressure pushes the wall of the colon outward at certain weak points. A diet that is too low in fiber can cause diverticula. Follow-up care is a key part of your treatment and safety. Be sure to make and go to all appointments, and call your doctor if you are having problems. It's also a good idea to know your test results and keep a list of the medicines you take. How can you care for yourself at home? · Include fruits, leafy green vegetables, beans, and whole grains in your diet each day. These foods are high in fiber. · Take a fiber supplement, such as Citrucel or Metamucil, every day if needed. Read and follow all instructions on the label. · Drink plenty of fluids, enough so that your urine is light yellow or clear like water. If you have kidney, heart, or liver disease and have to limit fluids, talk with your doctor before you increase the amount of fluids you drink. · Get at least 30 minutes of exercise on most days of the week. Walking is a good choice. You also may want to do other activities, such as running, swimming, cycling, or playing tennis or team sports. · Cut out foods that cause gas, pain, or other symptoms. When should you call for help? Call your doctor now or seek immediate medical care if:  ? · You have belly pain. ? · You pass maroon or very bloody stools. ? · You have a fever. ? · You have nausea and vomiting. ? · You have unusual changes in your bowel movements or abdominal swelling. ? · You have burning pain when you urinate. ? · You have abnormal vaginal discharge. ? · You have shoulder pain. ? · You have cramping pain that does not get better when you have a bowel movement or pass gas. ? · You pass gas or stool from your urethra while urinating. ? Watch closely for changes in your health, and be sure to contact your doctor if you have any problems. Where can you learn more? Go to http://gretchen-blair.info/. Enter P515 in the search box to learn more about \"Diverticulosis: Care Instructions. \"  Current as of: May 12, 2017  Content Version: 11.4  © 1349-4112 [employer]. Care instructions adapted under license by [employer] (which disclaims liability or warranty for this information). If you have questions about a medical condition or this instruction, always ask your healthcare professional. [place] [address] any warranty or liability for your use of this information. Gastritis: Care Instructions  Your Care Instructions    Gastritis is a sore and upset stomach.  It happens when something irritates the stomach lining. Many things can cause it. These include an infection such as the flu or something you ate or drank. Medicines or a sore on the lining of the stomach (ulcer) also can cause it. Your belly may bloat and ache. You may belch, vomit, and feel sick to your stomach. You should be able to relieve the problem by taking medicine. And it may help to change your diet. If gastritis lasts, your doctor may prescribe medicine. Follow-up care is a key part of your treatment and safety. Be sure to make and go to all appointments, and call your doctor if you are having problems. It's also a good idea to know your test results and keep a list of the medicines you take. How can you care for yourself at home? · If your doctor prescribed antibiotics, take them as directed. Do not stop taking them just because you feel better. You need to take the full course of antibiotics. · Be safe with medicines. If your doctor prescribed medicine to decrease stomach acid, take it as directed. Call your doctor if you think you are having a problem with your medicine. · Do not take any other medicine, including over-the-counter pain relievers, without talking to your doctor first.  · If your doctor recommends over-the-counter medicine to reduce stomach acid, such as Pepcid AC, Prilosec, Tagamet HB, or Zantac 75, follow the directions on the label. · Drink plenty of fluids (enough so that your urine is light yellow or clear like water) to prevent dehydration. Choose water and other caffeine-free clear liquids. If you have kidney, heart, or liver disease and have to limit fluids, talk with your doctor before you increase the amount of fluids you drink. · Limit how much alcohol you drink. · Avoid coffee, tea, cola drinks, chocolate, and other foods with caffeine. They increase stomach acid. When should you call for help? Call 911 anytime you think you may need emergency care.  For example, call if:  ? · You vomit blood or what looks like coffee grounds. ? · You pass maroon or very bloody stools. ?Call your doctor now or seek immediate medical care if:  ? · You start breathing fast and have not produced urine in the last 8 hours. ? · You cannot keep fluids down. ? Watch closely for changes in your health, and be sure to contact your doctor if:  ? · You do not get better as expected. Where can you learn more? Go to http://gretchen-blair.info/. Enter [id number] in the search box to learn more about \"Gastritis: Care Instructions. \"  Current as of: May 12, 2017  Content Version: 11.4  © 2662-5556 [employer]. Care instructions adapted under license by [employer] (which disclaims liability or warranty for this information). If you have questions about a medical condition or this instruction, always ask your healthcare professional. [address] any warranty or liability for your use of this information.

## 2018-06-19 NOTE — PROCEDURES
[facility], [facility] (EGD) Procedure Note    Iqra Dillon  1970  [id number]      Procedure: Endoscopic Gastroduodenoscopy --diagnostic, with biopsy    Indication: GERD    Pre-operative Diagnosis: see indication above    Post-operative Diagnosis: see findings below    : Franci Love. Marquez Liu MD    Referring Provider:  Phoebe Hidalgo MD      Anesthesia/Sedation:  MAC anesthesia Propofol        Procedure Details     After informed consent was obtained for the procedure, with all risks and benefits of procedure explained the patient was taken to the endoscopy suite and placed in the left lateral decubitus position. Following sequential administration of sedation as per above, the endoscope was inserted into the mouth and advanced under direct vision to second portion of the duodenum. A careful inspection was made as the gastroscope was withdrawn, including a retroflexed view of the proximal stomach; findings and interventions are described below. Findings:   Esophagus: LA Class B esophagitis in the distal esophagus  Stomach: the stomach anatomy is consistent with sleeve gastrectomy. An exposed suture was present in the proximal sleeve. The lumen at the level of the angularis was patent and retroflexed estimation of the diameter is 20-22 mm. There was patchy erythema at the antrum - cold biopsies taken. Duodenum: normal to second portion      Therapies: as above    Specimens: 1. Gastric antrum         EBL: None      Complications:   None; patient tolerated the procedure well. Impression:    As above    Recommendations:  1. Follow up surgical pathology  2. Continue PPI  3. Discontinue NSAID's  4. Treat for H pylori, if positive  5. Proceed to colonoscopy    Signed By: Franci Liu MD     6/19/2018  4:26 PM

## 2018-06-19 NOTE — ANESTHESIA PREPROCEDURE EVALUATION
Anesthetic History   No history of anesthetic complications            Review of Systems / Medical History  Patient summary reviewed, nursing notes reviewed and pertinent labs reviewed    Pulmonary        Sleep apnea           Neuro/Psych         Psychiatric history     Cardiovascular    Hypertension                   GI/Hepatic/Renal     GERD      Liver disease     Endo/Other        Morbid obesity and arthritis     Other Findings              Physical Exam    Airway  Mallampati: II  TM Distance: > 6 cm  Neck ROM: normal range of motion   Mouth opening: Normal     Cardiovascular  Regular rate and rhythm,  S1 and S2 normal,  no murmur, click, rub, or gallop             Dental  No notable dental hx       Pulmonary  Breath sounds clear to auscultation               Abdominal  GI exam deferred       Other Findings            Anesthetic Plan    ASA: 2  Anesthesia type: MAC          Induction: Intravenous  Anesthetic plan and risks discussed with: Patient

## 2018-06-19 NOTE — PERIOP NOTES

## 2018-06-20 NOTE — ANESTHESIA POSTPROCEDURE EVALUATION
Post-Anesthesia Evaluation and Assessment    Patient: Pita Phoenix MRN: [id number]  SSN: [id number]    YOB: 1970  Age: 49 y.o. Sex: female       Cardiovascular Function/Vital Signs  Visit Vitals    /84    Pulse 68    Temp 36.9 °C (98.5 °F)    Resp 15    Ht 5' 8\" (1.727 m)    Wt 70.8 kg (156 lb)    SpO2 100%    Breastfeeding No    BMI 23.72 kg/m2       Patient is status post MAC anesthesia for Procedure(s):  ESOPHAGOGASTRODUODENOSCOPY (EGD)  COLONOSCOPY  ESOPHAGOGASTRODUODENAL (EGD) BIOPSY  ENDOSCOPIC POLYPECTOMY. Nausea/Vomiting: None    Postoperative hydration reviewed and adequate. Pain:  Pain Scale 1: Numeric (0 - 10) (06/19/18 1640)  Pain Intensity 1: 0 (06/19/18 1640)   Managed    Neurological Status: At baseline    Mental Status and Level of Consciousness: Arousable    Pulmonary Status:   O2 Device: Room air (06/19/18 1640)   Adequate oxygenation and airway patent    Complications related to anesthesia: None    Post-anesthesia assessment completed.  No concerns    Signed By: Harry Adam MD     June 20, 2018

## 2018-06-22 ENCOUNTER — OFFICE VISIT (OUTPATIENT)
Dept: FAMILY MEDICINE CLINIC | Age: 48
End: 2018-06-22

## 2018-06-22 VITALS
HEART RATE: 71 BPM | BODY MASS INDEX: 28.79 KG/M2 | TEMPERATURE: 98.2 F | SYSTOLIC BLOOD PRESSURE: 149 MMHG | OXYGEN SATURATION: 98 % | DIASTOLIC BLOOD PRESSURE: 90 MMHG | HEIGHT: 68 IN | WEIGHT: 190 LBS | RESPIRATION RATE: 18 BRPM

## 2018-06-22 DIAGNOSIS — I10 ESSENTIAL HYPERTENSION: Primary | ICD-10-CM

## 2018-06-22 DIAGNOSIS — J41.0 SIMPLE CHRONIC BRONCHITIS (HCC): ICD-10-CM

## 2018-06-22 DIAGNOSIS — G89.4 CHRONIC PAIN SYNDROME: ICD-10-CM

## 2018-06-22 DIAGNOSIS — F31.60 BIPOLAR DISORDER, MIXED (HCC): ICD-10-CM

## 2018-06-22 DIAGNOSIS — Z72.0 TOBACCO USE: ICD-10-CM

## 2018-06-22 DIAGNOSIS — K21.9 GASTROESOPHAGEAL REFLUX DISEASE WITHOUT ESOPHAGITIS: ICD-10-CM

## 2018-06-22 DIAGNOSIS — E78.49 OTHER HYPERLIPIDEMIA: ICD-10-CM

## 2018-06-22 RX ORDER — ALBUTEROL SULFATE 90 UG/1
1-2 AEROSOL, METERED RESPIRATORY (INHALATION)
Qty: 1 INHALER | Refills: 5 | Status: SHIPPED | OUTPATIENT
Start: 2018-06-22 | End: 2018-06-28 | Stop reason: CLARIF

## 2018-06-22 RX ORDER — TRIAMTERENE AND HYDROCHLOROTHIAZIDE 37.5; 25 MG/1; MG/1
1 CAPSULE ORAL DAILY
Qty: 30 CAP | Refills: 5 | Status: SHIPPED | OUTPATIENT
Start: 2018-06-22 | End: 2019-02-12 | Stop reason: SDUPTHER

## 2018-06-22 RX ORDER — DEXLANSOPRAZOLE 60 MG/1
1 CAPSULE, DELAYED RELEASE ORAL DAILY
Qty: 30 CAP | Refills: 5 | Status: SHIPPED | OUTPATIENT
Start: 2018-06-22 | End: 2019-01-10 | Stop reason: SDUPTHER

## 2018-06-22 RX ORDER — AMLODIPINE BESYLATE 10 MG/1
10 TABLET ORAL DAILY
Qty: 30 TAB | Refills: 5 | Status: SHIPPED | OUTPATIENT
Start: 2018-06-22 | End: 2019-02-20 | Stop reason: SDUPTHER

## 2018-06-22 RX ORDER — AMLODIPINE BESYLATE 10 MG/1
TABLET ORAL DAILY
COMMUNITY
End: 2018-06-22 | Stop reason: SDUPTHER

## 2018-06-22 RX ORDER — GABAPENTIN 300 MG/1
300 CAPSULE ORAL 2 TIMES DAILY
Qty: 60 CAP | Refills: 5 | Status: SHIPPED | OUTPATIENT
Start: 2018-06-22 | End: 2020-04-27

## 2018-06-22 RX ORDER — QUETIAPINE FUMARATE 200 MG/1
200 TABLET, FILM COATED ORAL
Qty: 30 TAB | Refills: 5 | Status: SHIPPED | OUTPATIENT
Start: 2018-06-22 | End: 2018-10-16 | Stop reason: SDUPTHER

## 2018-06-22 NOTE — PROGRESS NOTES
Chief Complaint   Patient presents with    New Patient     1. Have you been to the ER, urgent care clinic since your last visit? Hospitalized since your last visit? No    2. Have you seen or consulted any other health care providers outside of the [address] [place] [address] since your last visit? Include any pap smears or colon screening.  No'

## 2018-06-22 NOTE — PROGRESS NOTES
[facility] Note    Teodoro Aquino is a 49 y.o. female who was seen in clinic today (6/22/2018). Subjective:  Cardiovascular Review:  The patient has hypertension and hyperlipidemia. Diet and Lifestyle: generally follows a low fat low cholesterol diet, generally follows a low sodium diet, sedentary, smoker 1/2 ppd  Home BP Monitoring: is not measured at home. Pertinent ROS: taking medications as instructed, no medication side effects noted, no TIA's, no chest pain on exertion, no dyspnea on exertion, no swelling of ankles. Patient has a h/o bipolar disorder and depression. Not currently seen by psychiatry. Patient seen by GYN routinely - had hysterectomy in 2014 for uterine cancer. Osteoarthritis and Chronic Pain:  Patient has osteoarthritis and arthralgias, primarily affecting the neck, back and knees. Symptoms onset: problem is longstanding - connected to non combat  injuries. Rheumatological ROS: using narcotic analgesics regularly daily and requesting refill of medications - Oxycodone and Oxymorphone. Previously seen by pain management, Dr. Jake Hopkins in [facility]. Social: single , on disability. Recently moved from [facility] to [facility] to be closer to daughter and granddaughter. Prior to Admission medications    Medication Sig Start Date End Date Taking? Authorizing Provider   amLODIPine (NORVASC) 10 mg tablet Take 1 Tab by mouth daily. For blood pressure 6/22/18  Yes Mathew Lynn NP   triamterene-hydroCHLOROthiazide (DYAZIDE) 37.5-25 mg per capsule Take 1 Cap by mouth daily. For blood pressure 6/22/18  Yes Mathew Lynn NP   Dexlansoprazole (DEXILANT) 60 mg CpDB Take 1 Cap by mouth daily. For acid reflux 6/22/18  Yes Mathew Lynn NP   tiotropium (SPIRIVA WITH HANDIHALER) 18 mcg inhalation capsule Take 1 Cap by inhalation daily.  6/22/18  Yes Mathew Lynn NP   gabapentin (NEURONTIN) 300 mg capsule Take 1 Cap by mouth two (2) times a day. 6/22/18  Yes Lambert Holman NP   QUEtiapine (SEROQUEL) 200 mg tablet Take 1 Tab by mouth nightly. 6/22/18  Yes Lambert Holman NP   albuterol (VENTOLIN HFA) 90 mcg/actuation inhaler Take 1-2 Puffs by inhalation every four (4) hours as needed for Wheezing. 6/22/18  Yes Lambert Holman NP   oxyCODONE IR (OXY-IR) 30 mg immediate release tablet Take 30 mg by mouth three (3) times daily. 8/21/14  Yes Historical Provider   oxymorphone (OPANA ER) 20 mg ER tablet 20 mg two (2) times a day. 8/11/14  Yes Historical Provider   MULTIVITAMINS WITH IRON (MULTI-VITAMIN WITH IRON PO) Take  by mouth. Yes Historical Provider   VITAMIN D2 50,000 unit capsule Take 50,000 Units by mouth every seven (7) days. 3/7/14  Yes Historical Provider   loratadine (CLARITIN) 10 mg tablet Take 10 mg by mouth every evening. Yes Historical Provider          Allergies   Allergen Reactions    Codeine Swelling     Tongue swells; Patient has tolerated Percocet and Morphine without adverse effects  Oxymorphone and oxycodone are home meds    Ambien [Zolpidem] Other (comments)     \"Sleep driving\"    Compazine [Prochlorperazine Edisylate] Swelling     Tongue swells    Lisinopril Swelling     Tongue swells      Peanut Nausea and Vomiting     PEANUTS IN SHELL - SEVERE N&V           ROS  See HPI    Objective:   Physical Exam   Constitutional: She is oriented to person, place, and time. She appears well-developed and well-nourished. Neck: Normal range of motion. Neck supple. No JVD present. Carotid bruit is not present. No thyromegaly present. Cardiovascular: Normal rate, regular rhythm and intact distal pulses. Exam reveals no gallop and no friction rub. No murmur heard. Pulmonary/Chest: Effort normal. No respiratory distress. She has decreased breath sounds. She has wheezes. Musculoskeletal: She exhibits no edema. Lymphadenopathy:     She has no cervical adenopathy.    Neurological: She is alert and oriented to person, place, and time. Psychiatric: She has a normal mood and affect. Her behavior is normal.   Nursing note and vitals reviewed. Visit Vitals    /90 (BP 1 Location: Right arm, BP Patient Position: Sitting)    Pulse 71    Temp 98.2 °F (36.8 °C) (Oral)    Resp 18    Ht 5' 8\" (1.727 m)    Wt 190 lb (86.2 kg)    LMP 08/04/2013    SpO2 98%    BMI 28.89 kg/m2       Assessment & Plan:  Diagnoses and all orders for this visit:    1. Essential hypertension  Borderline control. Refill medications. Begin home monitoring and call for reading >140/90  -     amLODIPine (NORVASC) 10 mg tablet; Take 1 Tab by mouth daily. For blood pressure  -     triamterene-hydroCHLOROthiazide (DYAZIDE) 37.5-25 mg per capsule; Take 1 Cap by mouth daily. For blood pressure    2. Bipolar disorder, mixed ([address]  Needs psychiatric management. Refill Seroquel. -     QUEtiapine (SEROQUEL) 200 mg tablet; Take 1 Tab by mouth nightly.  -     REFERRAL TO PSYCHIATRY    3. Chronic pain syndrome  Informed patient that we needed records supporting her need for chronic pain medication. No medications provided at time of visit. Referred to pain management for further evaluation.   -     gabapentin (NEURONTIN) 300 mg capsule; Take 1 Cap by mouth two (2) times a day. -     REFERRAL TO PAIN MANAGEMENT    4. Simple chronic bronchitis (HCC)  -     Refill tiotropium (SPIRIVA WITH HANDIHALER) 18 mcg inhalation capsule; Take 1 Cap by inhalation daily. -     Refill albuterol (VENTOLIN HFA) 90 mcg/actuation inhaler; Take 1-2 Puffs by inhalation every four (4) hours as needed for Wheezing. 5. Other hyperlipidemia  Fasting labs needed. 6. Gastroesophageal reflux disease without esophagitis  -     Refill Dexlansoprazole (DEXILANT) 60 mg CpDB; Take 1 Cap by mouth daily. For acid reflux    7. Tobacco use  Counseled patient on need to quit smoking.       I have discussed the diagnosis with the patient and the intended plan as seen in the above orders. The patient has received an after-visit summary along with patient information handout. I have discussed medication side effects and warnings with the patient as well. Follow-up Disposition:  Return in about 6 months (around 12/22/2018) for blood pressure, disease management.         Luis Flores NP

## 2018-06-22 NOTE — MR AVS SNAPSHOT
Angella Singh 
 
 
 [address] 
[phone] Patient: Krystina Toure MRN: [id number] :[id number] Visit Information Date & Time Provider Department Dept. Phone Encounter #  
 2018 11:00 AM Luis Flores  [facility] [phone] [id number] Follow-up Instructions Return in about 6 months (around 2018) for blood pressure, disease management. Upcoming Health Maintenance Date Due Influenza Age 5 to Adult 2018 PAP AKA CERVICAL CYTOLOGY 2021 DTaP/Tdap/Td series (2 - Td) 2025 Allergies as of 2018  Review Complete On: 2018 By: Luis Flores NP Severity Noted Reaction Type Reactions Codeine High 10/24/2012   Systemic Swelling Tongue swells; Patient has tolerated Percocet and Morphine without adverse effects Oxymorphone and oxycodone are home meds Ambien [Zolpidem]  2018    Other (comments) \"Sleep driving\" Compazine [Prochlorperazine Edisylate]  10/24/2012    Swelling Tongue swells Lisinopril  10/24/2012    Swelling Tongue swells Peanut  2014    Nausea and Vomiting PEANUTS IN SHELL - SEVERE N&V Current Immunizations  Reviewed on 2014 Name Date Pneumococcal Vaccine (Unspecified Type) 2013 Tdap 2015  3:28 PM  
  
 Not reviewed this visit You Were Diagnosed With   
  
 Codes Comments Essential hypertension    -  Primary ICD-10-CM: I10 
ICD-9-CM: 401.9 Bipolar disorder, mixed ([facility]     ICD-10-CM: F31.60 ICD-9-CM: 296.60 Chronic pain syndrome     ICD-10-CM: G89.4 ICD-9-CM: 338.4 Simple chronic bronchitis (HCC)     ICD-10-CM: J41.0 ICD-9-CM: 491.0 Other hyperlipidemia     ICD-10-CM: E78.4 ICD-9-CM: 272.4 Gastroesophageal reflux disease without esophagitis     ICD-10-CM: K21.9 ICD-9-CM: 530.81 Tobacco use     ICD-10-CM: Z72.0 ICD-9-CM: 305.1 Vitals BP Pulse Temp Resp Height(growth percentile) Weight(growth percentile) 149/90 (BP 1 Location: Right arm, BP Patient Position: Sitting) 71 98.2 °F (36.8 °C) (Oral) 18 5' 8\" (1.727 m) 190 lb (86.2 kg) LMP SpO2 BMI OB Status Smoking Status 08/04/2013 98% 28.89 kg/m2 Hysterectomy Current Every Day Smoker Vitals History BMI and BSA Data Body Mass Index Body Surface Area  
 28.89 kg/m 2 2.03 m 2 Preferred Pharmacy Pharmacy Name Phone [address] [place] [address] Rd. [phone] Your Updated Medication List  
  
   
This list is accurate as of 6/22/18 11:24 AM.  Always use your most recent med list.  
  
  
  
  
 albuterol 90 mcg/actuation inhaler Commonly known as:  VENTOLIN HFA Take 1-2 Puffs by inhalation every four (4) hours as needed for Wheezing. amLODIPine 10 mg tablet Commonly known as:  Orval Verena Take 1 Tab by mouth daily. For blood pressure CLARITIN 10 mg tablet Generic drug:  loratadine Take 10 mg by mouth every evening. Dexlansoprazole 60 mg Cpdb Commonly known as:  DEXILANT Take 1 Cap by mouth daily. For acid reflux  
  
 gabapentin 300 mg capsule Commonly known as:  NEURONTIN Take 1 Cap by mouth two (2) times a day. MULTI-VITAMIN WITH IRON PO Take  by mouth. oxyCODONE IR 30 mg immediate release tablet Commonly known as:  [place] Take 30 mg by mouth three (3) times daily. oxyMORphone 20 mg ER tablet Commonly known as:  OPANA ER  
20 mg two (2) times a day. QUEtiapine 200 mg tablet Commonly known as:  SEROquel Take 1 Tab by mouth nightly. tiotropium 18 mcg inhalation capsule Commonly known as:  [employer] [address] Take 1 Cap by inhalation daily. triamterene-hydroCHLOROthiazide 37.5-25 mg per capsule Commonly known as:  Leamon Pages Take 1 Cap by mouth daily. For blood pressure VITAMIN D2 50,000 unit capsule Generic drug:  ergocalciferol Take 50,000 Units by mouth every seven (7) days. Prescriptions Sent to Pharmacy Refills  
 amLODIPine (NORVASC) 10 mg tablet 5 Sig: Take 1 Tab by mouth daily. For blood pressure Class: Normal  
 Pharmacy: [facility] [address] Ph #: [phone] Route: Oral  
 triamterene-hydroCHLOROthiazide (DYAZIDE) 37.5-25 mg per capsule 5 Sig: Take 1 Cap by mouth daily. For blood pressure Class: Normal  
 Pharmacy: [facility] [address] Ph #: [phone] Route: Oral  
 Dexlansoprazole (DEXILANT) 60 mg CpDB 5 Sig: Take 1 Cap by mouth daily. For acid reflux Class: Normal  
 Pharmacy: [facility] [address] Ph #: [phone] Route: Oral  
 tiotropium (SPIRIVA WITH HANDIHALER) 18 mcg inhalation capsule 5 Sig: Take 1 Cap by inhalation daily. Class: Normal  
 Pharmacy: [facility] [address] Ph #: [phone] Route: Inhalation  
 gabapentin (NEURONTIN) 300 mg capsule 5 Sig: Take 1 Cap by mouth two (2) times a day. Class: Normal  
 Pharmacy: [facility] [address] Ph #: [phone] Route: Oral  
 QUEtiapine (SEROQUEL) 200 mg tablet 5 Sig: Take 1 Tab by mouth nightly. Class: Normal  
 Pharmacy: [facility] [address] Ph #: [phone] Route: Oral  
 albuterol (VENTOLIN HFA) 90 mcg/actuation inhaler 5 Sig: Take 1-2 Puffs by inhalation every four (4) hours as needed for Wheezing. Class: Normal  
 Pharmacy: [facility] [address] Ph #: [phone] Route: Inhalation We Performed the Following REFERRAL TO PAIN MANAGEMENT [DYJ951 Custom] REFERRAL TO PSYCHIATRY [REF91 Custom] Follow-up Instructions  Return in about 6 months (around 12/22/2018) for blood pressure, disease management. Referral Information Referral ID Referred By Referred To  
  
 [id number] Yahir De La Rosa, SUDHAKAR   
   [address] [address] [address] Lil[address] Dial Phone: [phone] Fax: [phone] Visits Status Start Date End Date 1 New Request 6/22/18 6/22/19 If your referral has a status of pending review or denied, additional information will be sent to support the outcome of this decision. Referral ID Referred By Referred To  
 [id number] QUIN, [address] Belinda COHEN [facility], [address] [place] [address] Visits Status Start Date End Date 1 New Request 6/22/18 6/22/19 If your referral has a status of pending review or denied, additional information will be sent to support the outcome of this decision. Patient Instructions High Blood Pressure: Care Instructions Your Care Instructions If your blood pressure is usually above 140/90, you have high blood pressure, or hypertension. That means the top number is 140 or higher or the bottom number is 90 or higher, or both. Despite what a lot of people think, high blood pressure usually doesn't cause headaches or make you feel dizzy or lightheaded. It usually has no symptoms. But it does increase your risk for heart attack, stroke, and kidney or eye damage. The higher your blood pressure, the more your risk increases. Your doctor will give you a goal for your blood pressure. Your goal will be based on your health and your age. An example of a goal is to keep your blood pressure below 140/90. Lifestyle changes, such as eating healthy and being active, are always important to help lower blood pressure. You might also take medicine to reach your blood pressure goal. 
Follow-up care is a key part of your treatment and safety.  Be sure to make and go to all appointments, and call your doctor if you are having problems. It's also a good idea to know your test results and keep a list of the medicines you take. How can you care for yourself at home? Medical treatment · If you stop taking your medicine, your blood pressure will go back up. You may take one or more types of medicine to lower your blood pressure. Be safe with medicines. Take your medicine exactly as prescribed. Call your doctor if you think you are having a problem with your medicine. · Talk to your doctor before you start taking aspirin every day. Aspirin can help certain people lower their risk of a heart attack or stroke. But taking aspirin isn't right for everyone, because it can cause serious bleeding. · See your doctor regularly. You may need to see the doctor more often at first or until your blood pressure comes down. · If you are taking blood pressure medicine, talk to your doctor before you take decongestants or anti-inflammatory medicine, such as ibuprofen. Some of these medicines can raise blood pressure. · Learn how to check your blood pressure at home. Lifestyle changes · Stay at a healthy weight. This is especially important if you put on weight around the waist. Losing even 10 pounds can help you lower your blood pressure. · If your doctor recommends it, get more exercise. Walking is a good choice. Bit by bit, increase the amount you walk every day. Try for at least 30 minutes on most days of the week. You also may want to swim, bike, or do other activities. · Avoid or limit alcohol. Talk to your doctor about whether you can drink any alcohol. · Try to limit how much sodium you eat to less than 2,300 milligrams (mg) a day. Your doctor may ask you to try to eat less than 1,500 mg a day. · Eat plenty of fruits (such as bananas and oranges), vegetables, legumes, whole grains, and low-fat dairy products. · Lower the amount of saturated fat in your diet.  Saturated fat is found in animal products such as milk, cheese, and meat. Limiting these foods may help you lose weight and also lower your risk for heart disease. · Do not smoke. Smoking increases your risk for heart attack and stroke. If you need help quitting, talk to your doctor about stop-smoking programs and medicines. These can increase your chances of quitting for good. When should you call for help? Call 911 anytime you think you may need emergency care. This may mean having symptoms that suggest that your blood pressure is causing a serious heart or blood vessel problem. Your blood pressure may be over 180/110. ? For example, call 911 if: 
? · You have symptoms of a heart attack. These may include: ¨ Chest pain or pressure, or a strange feeling in the chest. 
¨ Sweating. ¨ Shortness of breath. ¨ Nausea or vomiting. ¨ Pain, pressure, or a strange feeling in the back, neck, jaw, or upper belly or in one or both shoulders or arms. ¨ Lightheadedness or sudden weakness. ¨ A fast or irregular heartbeat. ? · You have symptoms of a stroke. These may include: 
¨ Sudden numbness, tingling, weakness, or loss of movement in your face, arm, or leg, especially on only one side of your body. ¨ Sudden vision changes. ¨ Sudden trouble speaking. ¨ Sudden confusion or trouble understanding simple statements. ¨ Sudden problems with walking or balance. ¨ A sudden, severe headache that is different from past headaches. ? · You have severe back or belly pain. ?Do not wait until your blood pressure comes down on its own. Get help right away. ?Call your doctor now or seek immediate care if: 
? · Your blood pressure is much higher than normal (such as 180/110 or higher), but you don't have symptoms. ? · You think high blood pressure is causing symptoms, such as: ¨ Severe headache. ¨ Blurry vision. ? Watch closely for changes in your health, and be sure to contact your doctor if: 
? · Your blood pressure measures 140/90 or higher at least 2 times. That means the top number is 140 or higher or the bottom number is 90 or higher, or both. ? · You think you may be having side effects from your blood pressure medicine. ? · Your blood pressure is usually normal, but it goes above normal at least 2 times. Where can you learn more? Go to http://gretchen-blair.info/. Enter I513 in the search box to learn more about \"High Blood Pressure: Care Instructions. \" Current as of: September 21, 2016 Content Version: 11.4 © 5841-2186 Noquo. Care instructions adapted under license by [employer] (which disclaims liability or warranty for this information). If you have questions about a medical condition or this instruction, always ask your healthcare professional. [address] any warranty or liability for your use of this information. Introducing [facility]! [facility] introduces [employer] patient portal. Now you can access parts of your medical record, email your doctor's office, and request medication refills online. 1. In your internet browser, go to [email or web address] 2. Click on the First Time User? Click Here link in the Sign In box. You will see the New Member Sign Up page. 3. Enter your [employer] Access Code exactly as it appears below. You will not need to use this code after youve completed the sign-up process. If you do not sign up before the expiration date, you must request a new code. · [employer] Access Code: [email or web address] Expires: 9/17/2018  4:27 PM 
 
4. Enter the last four digits of your Social Security Number (xxxx) and Date of Birth (mm/dd/yyyy) as indicated and click Submit. You will be taken to the next sign-up page. 5. Create a FindYogit ID. This will be your [employer] login ID and cannot be changed, so think of one that is secure and easy to remember. 6. Create a [employer] password. You can change your password at any time.  
7. Enter your Password Reset Question and Answer. This can be used at a later time if you forget your password. 8. Enter your e-mail address. You will receive e-mail notification when new information is available in [address] 9. Click Sign Up. You can now view and download portions of your medical record. 10. Click the Download Summary menu link to download a portable copy of your medical information. If you have questions, please visit the Frequently Asked Questions section of the At[employer] website. Remember, [employer] is NOT to be used for urgent needs. For medical emergencies, dial 911. Now available from your iPhone and Android! Please provide this summary of care documentation to your next provider. Your primary care clinician is listed as Mathew Lynn. If you have any questions after today's visit, please call [phone].

## 2018-06-28 ENCOUNTER — TELEPHONE (OUTPATIENT)
Dept: FAMILY MEDICINE CLINIC | Age: 48
End: 2018-06-28

## 2018-06-28 RX ORDER — ALBUTEROL SULFATE 90 UG/1
1-2 AEROSOL, METERED RESPIRATORY (INHALATION)
Qty: 1 INHALER | Refills: 5 | Status: SHIPPED | OUTPATIENT
Start: 2018-06-28 | End: 2020-03-20 | Stop reason: SDUPTHER

## 2018-06-28 RX ORDER — LORATADINE 10 MG/1
10 TABLET ORAL DAILY
Qty: 30 TAB | Refills: 11 | Status: SHIPPED | OUTPATIENT
Start: 2018-06-28 | End: 2019-02-20 | Stop reason: SDUPTHER

## 2018-06-28 NOTE — TELEPHONE ENCOUNTER
PA initiated for Ventolin HFA. Ventolin was denied coverage patient has to have a trial of two preferred drugs within the same class, such as Proair HFA and Proventil HFA.

## 2018-07-12 ENCOUNTER — TELEPHONE (OUTPATIENT)
Dept: FAMILY MEDICINE CLINIC | Age: 48
End: 2018-07-12

## 2018-07-12 RX ORDER — OXYMORPHONE HYDROCHLORIDE 20 MG/1
20 TABLET, FILM COATED, EXTENDED RELEASE ORAL
Status: CANCELLED | OUTPATIENT
Start: 2018-07-12

## 2018-07-12 RX ORDER — OXYCODONE HYDROCHLORIDE 30 MG/1
30 TABLET ORAL 3 TIMES DAILY
Status: CANCELLED | OUTPATIENT
Start: 2018-07-12

## 2018-07-12 NOTE — TELEPHONE ENCOUNTER
Unfortunately, I have never received medical records to validate the need for chronic opioid medication. Patient has also been referred to pain management for follow up care. If her symptoms continue we can schedule an acute visit for to discuss alternative non-narcotic treatment options.

## 2018-07-12 NOTE — TELEPHONE ENCOUNTER
Call to patient.  verified. Informed patient of note below from provider. She states that her pain management physician is not taking new patients. She declines coming in for an office visit to discuss alternative treatment as she states that will not work. Informed patient to contact her insurance company to ask for list of pain management offices who is in her network accepting new patients.  She states she will

## 2018-07-12 NOTE — TELEPHONE ENCOUNTER
Patient is requesting a prescription for the medication. She states she has pinched nerve/herniated disc and is in lot of pain. She says she just moved from another city and would like Sharon Almeida to write a new prescription for the medication. Or another new prescription for other stronger medication. .  Requested Prescriptions     Pending Prescriptions Disp Refills    oxyCODONE IR (ROXICODONE) 30 mg immediate release tablet       Sig: Take 1 Tab by mouth three (3) times daily. Max Daily Amount: 90 mg.    oxyMORphone (OPANA ER) 20 mg ER tablet       Si Tab. Patient was informed that this medication was not prescribed by Sharon Almeida so he might not write a new prescription. Patient insisted on sending the message to Sharon Almeida.   Last seen : 2018  Pharmacy on file verified

## 2018-07-16 ENCOUNTER — TELEPHONE (OUTPATIENT)
Dept: FAMILY MEDICINE CLINIC | Age: 48
End: 2018-07-16

## 2018-07-16 NOTE — TELEPHONE ENCOUNTER
Patient called to provide fax number to doctor to obtain records. Request can be faxed to [facility] [phone].

## 2018-07-16 NOTE — TELEPHONE ENCOUNTER
Forms for Authorization to disclosed was faxed to [phone] on 6/29/2018 but will refax again today

## 2018-09-12 ENCOUNTER — OFFICE VISIT (OUTPATIENT)
Dept: FAMILY MEDICINE CLINIC | Age: 48
End: 2018-09-12

## 2018-09-12 ENCOUNTER — HOME HEALTH ADMISSION (OUTPATIENT)
Dept: HOME HEALTH SERVICES | Facility: HOME HEALTH | Age: 48
End: 2018-09-12
Payer: MEDICAID

## 2018-09-12 VITALS
OXYGEN SATURATION: 97 % | RESPIRATION RATE: 16 BRPM | WEIGHT: 211 LBS | SYSTOLIC BLOOD PRESSURE: 128 MMHG | BODY MASS INDEX: 31.98 KG/M2 | TEMPERATURE: 98.3 F | DIASTOLIC BLOOD PRESSURE: 87 MMHG | HEIGHT: 68 IN | HEART RATE: 95 BPM

## 2018-09-12 DIAGNOSIS — J01.01 ACUTE RECURRENT MAXILLARY SINUSITIS: ICD-10-CM

## 2018-09-12 DIAGNOSIS — M75.02 ADHESIVE CAPSULITIS OF LEFT SHOULDER: ICD-10-CM

## 2018-09-12 DIAGNOSIS — M50.30 DDD (DEGENERATIVE DISC DISEASE), CERVICAL: Primary | ICD-10-CM

## 2018-09-12 DIAGNOSIS — I10 ESSENTIAL HYPERTENSION: ICD-10-CM

## 2018-09-12 RX ORDER — FLUCONAZOLE 150 MG/1
150 TABLET ORAL DAILY
Qty: 1 TAB | Refills: 0 | Status: SHIPPED | OUTPATIENT
Start: 2018-09-12 | End: 2018-09-21 | Stop reason: SDUPTHER

## 2018-09-12 RX ORDER — AMOXICILLIN AND CLAVULANATE POTASSIUM 875; 125 MG/1; MG/1
1 TABLET, FILM COATED ORAL EVERY 12 HOURS
Qty: 20 TAB | Refills: 0 | Status: SHIPPED | OUTPATIENT
Start: 2018-09-12 | End: 2018-09-22

## 2018-09-12 RX ORDER — METHYLPREDNISOLONE 4 MG/1
TABLET ORAL
Qty: 1 DOSE PACK | Refills: 0 | Status: SHIPPED | OUTPATIENT
Start: 2018-09-12 | End: 2018-10-20 | Stop reason: ALTCHOICE

## 2018-09-12 RX ORDER — PANTOPRAZOLE SODIUM 40 MG/1
TABLET, DELAYED RELEASE ORAL
COMMUNITY
Start: 2018-07-08 | End: 2019-01-10 | Stop reason: ALTCHOICE

## 2018-09-12 RX ORDER — CANE TIPS
EACH MISCELLANEOUS
Qty: 1 DEVICE | Status: SHIPPED | OUTPATIENT
Start: 2018-09-12 | End: 2018-09-14 | Stop reason: SDUPTHER

## 2018-09-12 RX ORDER — MELOXICAM 7.5 MG/1
TABLET ORAL
COMMUNITY
Start: 2018-09-06 | End: 2018-09-19 | Stop reason: CLARIF

## 2018-09-12 NOTE — PROGRESS NOTES
Chief Complaint   Patient presents with    Headache     6/10 pain- x 2 weeks    Nasal Congestion     x 2 weeks    Extremity Weakness     left arm weakness- x 3 months      1. Have you been to the ER, urgent care clinic since your last visit? Hospitalized since your last visit? No    2. Have you seen or consulted any other health care providers outside of the [address] [place] [address] since your last visit? Include any pap smears or colon screening.  No

## 2018-09-12 NOTE — PROGRESS NOTES
[facility] Note    Awilda Adames is a 51 y.o. female who was seen in clinic today (9/12/2018). Subjective:  Cardiovascular Review:  The patient has hypertension and hyperlipidemia. Diet and Lifestyle: generally follows a low fat low cholesterol diet, generally follows a low sodium diet, sedentary, smoker 1/2 ppd  Home BP Monitoring: is not measured at home. Pertinent ROS: taking medications as instructed, no medication side effects noted, no TIA's, no chest pain on exertion, no dyspnea on exertion, no swelling of ankles. Patient has a h/o bipolar disorder and depression. Not currently seen by psychiatry. Patient seen by GYN routinely - had hysterectomy in 2014 for uterine cancer. Neck Pain  Patient complains of neck pain. Onset of symptoms was several years ago, gradually worsening since that time. Current symptoms are pain in neck (aching, severe in character; 8/10 in severity). Patient reports numbness, tingling, paresthesias in upper extremities. Patient reports weakness, diminished  strength, lack of coordination. Patient has had previous osteoarthritis of cervical spine. Osteoarthritis and Chronic Pain:  Patient has osteoarthritis and arthralgias, primarily affecting the neck, back and knees. Symptoms onset: problem is longstanding - connected to non combat  injuries. Rheumatological ROS: using narcotic analgesics regularly daily and requesting refill of medications - Oxycodone and Oxymorphone. Previously seen by pain management, Dr. Honey Murillo in [facility]. Sinus Pain  Patient complains of congestion and sinus pressure. Onset of symptoms was 2 weeks ago, unchanged since that time. She is drinking plenty of fluids. .  Past history is significant for tobacco abuse. Social: single, on disability. Recently moved from [facility] to [place] to be closer to daughter and grandson.        Prior to Admission medications Medication Sig Start Date End Date Taking? Authorizing Provider   meloxicam (MOBIC) 7.5 mg tablet  9/6/18  Yes Historical Provider   pantoprazole (PROTONIX) 40 mg tablet  7/8/18  Yes Historical Provider   amoxicillin-clavulanate (AUGMENTIN) 875-125 mg per tablet Take 1 Tab by mouth every twelve (12) hours for 10 days. 9/12/18 9/22/18 Yes Page Aquino, NP   methylPREDNISolone (MEDROL DOSEPACK) 4 mg tablet Take as directed 9/12/18  Yes Page Aquino, NP   fluconazole (DIFLUCAN) 150 mg tablet Take 1 Tab by mouth daily for 1 day. FDA advises cautious prescribing of oral fluconazole in pregnancy. 9/12/18 9/13/18 Yes Page Aquino, NP   Destiny Hedger For use as needed 9/12/18  Yes Page Aquino, NP   loratadine (CLARITIN) 10 mg tablet Take 1 Tab by mouth daily. 6/28/18  Yes Page Aquino, NP   albuterol (PROVENTIL HFA, VENTOLIN HFA, PROAIR HFA) 90 mcg/actuation inhaler Take 1-2 Puffs by inhalation every four (4) hours as needed for Wheezing. 6/28/18  Yes Page Aquino, NP   amLODIPine (NORVASC) 10 mg tablet Take 1 Tab by mouth daily. For blood pressure 6/22/18  Yes Page Aquino, NP   triamterene-hydroCHLOROthiazide (DYAZIDE) 37.5-25 mg per capsule Take 1 Cap by mouth daily. For blood pressure 6/22/18  Yes Page Aquino, NP   Dexlansoprazole (DEXILANT) 60 mg CpDB Take 1 Cap by mouth daily. For acid reflux 6/22/18  Yes Page Aquino, NP   tiotropium (SPIRIVA WITH HANDIHALER) 18 mcg inhalation capsule Take 1 Cap by inhalation daily. 6/22/18  Yes Page Aquino NP   gabapentin (NEURONTIN) 300 mg capsule Take 1 Cap by mouth two (2) times a day. 6/22/18  Yes Page Aquino, NP   QUEtiapine (SEROQUEL) 200 mg tablet Take 1 Tab by mouth nightly. 6/22/18  Yes Page Aquino, NP   oxyCODONE IR (OXY-IR) 30 mg immediate release tablet Take 30 mg by mouth three (3) times daily. 8/21/14  Yes Historical Provider   MULTIVITAMINS WITH IRON (MULTI-VITAMIN WITH IRON PO) Take  by mouth.    Yes Historical Provider   VITAMIN D2 50,000 unit capsule Take 50,000 Units by mouth every seven (7) days. 3/7/14  Yes Historical Provider          Allergies   Allergen Reactions    Codeine Swelling     Tongue swells; Patient has tolerated Percocet and Morphine without adverse effects  Oxymorphone and oxycodone are home meds    Ambien [Zolpidem] Other (comments)     \"Sleep driving\"    Compazine [Prochlorperazine Edisylate] Swelling     Tongue swells    Lisinopril Swelling     Tongue swells      Peanut Nausea and Vomiting     PEANUTS IN SHELL - SEVERE N&V           ROS  See HPI    Objective:   Physical Exam   Constitutional: She is oriented to person, place, and time. She appears well-developed and well-nourished. HENT:   Nose: Right sinus exhibits maxillary sinus tenderness. Left sinus exhibits maxillary sinus tenderness. Cardiovascular: Normal rate, regular rhythm and intact distal pulses. Exam reveals no gallop and no friction rub. No murmur heard. Pulmonary/Chest: Effort normal and breath sounds normal. No respiratory distress. Musculoskeletal:        Left shoulder: She exhibits decreased range of motion (with overhead movements) and decreased strength. She exhibits no tenderness and no bony tenderness. Cervical back: She exhibits decreased range of motion (with rotation) and tenderness. She exhibits no bony tenderness. Left hand: Decreased strength ( strength) noted. Neurological: She is alert and oriented to person, place, and time. Psychiatric: She has a normal mood and affect. Her behavior is normal. Judgment and thought content normal.   Nursing note and vitals reviewed.         Visit Vitals    /87 (BP 1 Location: Right arm, BP Patient Position: Sitting)    Pulse 95    Temp 98.3 °F (36.8 °C) (Oral)    Resp 16    Ht 5' 8\" (1.727 m)    Wt 211 lb (95.7 kg)    LMP 08/04/2013    SpO2 97%    BMI 32.08 kg/m2       Assessment & Plan:  Diagnoses and all orders for this visit: 1. DDD (degenerative disc disease), cervical  X-ray today. Request orthopedic evaluation. Request physical therapy evaluation and treatment. Begin Medrol dose pack. -     [facility]  -     methylPREDNISolone (MEDROL DOSEPACK) 4 mg tablet; Take as directed  -     XR SPINE CERV 4 OR 5 V; Future  -     REFERRAL TO ORTHOPEDIC SURGERY    2. Adhesive capsulitis of left shoulder  Request physical therapy evaluation and treatment. X-ray today. -     [facility]  -     methylPREDNISolone (MEDROL DOSEPACK) 4 mg tablet; Take as directed  -     XR SHOULDER LT AP/LAT MIN 2 V; Future    3. Acute recurrent maxillary sinusitis  Normal saline nasal rinse daily  -     Cover with amoxicillin-clavulanate (AUGMENTIN) 875-125 mg per tablet; Take 1 Tab by mouth every twelve (12) hours for 10 days. 4. Essential hypertension  Well controlled, no medication changes. Other orders  -     fluconazole (DIFLUCAN) 150 mg tablet; Take 1 Tab by mouth daily for 1 day. FDA advises cautious prescribing of oral fluconazole in pregnancy. -     Cane jeannie; For use as needed        I have discussed the diagnosis with the patient and the intended plan as seen in the above orders. The patient has received an after-visit summary along with patient information handout. I have discussed medication side effects and warnings with the patient as well. Follow-up Disposition:  Return if symptoms worsen or fail to improve.         Kimberly Lara NP

## 2018-09-12 NOTE — MR AVS SNAPSHOT
[facility] 
 
 
 [address] [place] 
[phone] Patient: Dyana Pickett MRN: [id number] :1970 Visit Information Date & Time Provider Department Dept. Phone Encounter #  
 2018  2:00 PM Dario Tellez  [facility] [phone] [id number] Follow-up Instructions Return if symptoms worsen or fail to improve. Your Appointments 2018  2:00 PM  
New Patient with SUDHAKAR Larsen 5 ([facility]) Appt Note: referred by Dr. Yanet Condon for Bipolar d/o; referred by Dr. Yanet Condon for Bipolar d/o  
 [address] [place] [address]  
[phone] [address] [facility] Upcoming Health Maintenance Date Due Influenza Age 5 to Adult 2018* PAP AKA CERVICAL CYTOLOGY 2021 DTaP/Tdap/Td series (2 - Td) 2025 *Topic was postponed. The date shown is not the original due date. Allergies as of 2018  Review Complete On: 2018 By: Yanna Roberts LPN Severity Noted Reaction Type Reactions Codeine High 10/24/2012   Systemic Swelling Tongue swells; Patient has tolerated Percocet and Morphine without adverse effects Oxymorphone and oxycodone are home meds Ambien [Zolpidem]  2018    Other (comments) \"Sleep driving\" Compazine [Prochlorperazine Edisylate]  10/24/2012    Swelling Tongue swells Lisinopril  10/24/2012    Swelling Tongue swells Peanut  2014    Nausea and Vomiting PEANUTS IN SHELL - SEVERE N&V Current Immunizations  Reviewed on 2014 Name Date Pneumococcal Vaccine (Unspecified Type) 2013 Tdap 2015  3:28 PM  
  
 Not reviewed this visit You Were Diagnosed With   
  
 Codes Comments DDD (degenerative disc disease), cervical    -  Primary ICD-10-CM: M50.30 ICD-9-CM: 722.4  Adhesive capsulitis of left shoulder     ICD-10-CM: M75.02 
ICD-9-CM: 726.0 Acute recurrent maxillary sinusitis     ICD-10-CM: J01.01 
ICD-9-CM: 461.0 Vitals BP Pulse Temp Resp Height(growth percentile) Weight(growth percentile) 128/87 (BP 1 Location: Right arm, BP Patient Position: Sitting) 95 98.3 °F (36.8 °C) (Oral) 16 5' 8\" (1.727 m) 211 lb (95.7 kg) LMP SpO2 BMI OB Status Smoking Status 08/04/2013 97% 32.08 kg/m2 Hysterectomy Current Every Day Smoker Vitals History BMI and BSA Data Body Mass Index Body Surface Area 32.08 kg/m 2 2.14 m 2 Preferred Pharmacy Pharmacy Name Phone [address] [phone] Your Updated Medication List  
  
   
This list is accurate as of 9/12/18  2:38 PM.  Always use your most recent med list.  
  
  
  
  
 albuterol 90 mcg/actuation inhaler Commonly known as:  PROVENTIL HFA, VENTOLIN HFA, PROAIR HFA Take 1-2 Puffs by inhalation every four (4) hours as needed for Wheezing. amLODIPine 10 mg tablet Commonly known as:  Denia Darting Take 1 Tab by mouth daily. For blood pressure  
  
 amoxicillin-clavulanate 875-125 mg per tablet Commonly known as:  AUGMENTIN Take 1 Tab by mouth every twelve (12) hours for 10 days. Dexlansoprazole 60 mg Cpdb Commonly known as:  DEXILANT Take 1 Cap by mouth daily. For acid reflux  
  
 fluconazole 150 mg tablet Commonly known as:  DIFLUCAN Take 1 Tab by mouth daily for 1 day. FDA advises cautious prescribing of oral fluconazole in pregnancy. gabapentin 300 mg capsule Commonly known as:  NEURONTIN Take 1 Cap by mouth two (2) times a day. loratadine 10 mg tablet Commonly known as:  Imani Mabank Take 1 Tab by mouth daily. meloxicam 7.5 mg tablet Commonly known as:  MOBIC  
  
 methylPREDNISolone 4 mg tablet Commonly known as:  Sanchez Mais Take as directed MULTI-VITAMIN WITH IRON PO Take by mouth. oxyCODONE IR 30 mg immediate release tablet Commonly known as:  Dorthea Mccreedy Take 30 mg by mouth three (3) times daily. oxyMORphone 20 mg ER tablet Commonly known as:  OPANA ER  
20 mg two (2) times a day. pantoprazole 40 mg tablet Commonly known as:  PROTONIX QUEtiapine 200 mg tablet Commonly known as:  SEROquel Take 1 Tab by mouth nightly. tiotropium 18 mcg inhalation capsule Commonly known as:  [address] Take 1 Cap by inhalation daily. triamterene-hydroCHLOROthiazide 37.5-25 mg per capsule Commonly known as:  Chyrel Gals Take 1 Cap by mouth daily. For blood pressure VITAMIN D2 50,000 unit capsule Generic drug:  ergocalciferol Take 50,000 Units by mouth every seven (7) days. Prescriptions Sent to Pharmacy Refills  
 amoxicillin-clavulanate (AUGMENTIN) 875-125 mg per tablet 0 Sig: Take 1 Tab by mouth every twelve (12) hours for 10 days. Class: Normal  
 Pharmacy: [facility] DR ZACH Pyle , [address] [place] [address] Ph #: [phone] Route: Oral  
 methylPREDNISolone (MEDROL DOSEPACK) 4 mg tablet 0 Sig: Take as directed Class: Normal  
 Pharmacy: [facility] DR ZACH ANN [address] [place] [address],[facility] Ph #: [phone]  
 fluconazole (DIFLUCAN) 150 mg tablet 0 Sig: Take 1 Tab by mouth daily for 1 day. FDA advises cautious prescribing of oral fluconazole in pregnancy. Class: Normal  
 Pharmacy: [facility] DR ZACH Pyle , [address] [place] [address] Ph #: [phone] Route: Oral  
  
We Performed the Following [address] Comments: PT for neck and shoulder. Home health aide for bathing Follow-up Instructions Return if symptoms worsen or fail to improve. To-Do List   
 09/12/2018 Imaging:  XR SHOULDER LT AP/LAT MIN 2 V   
  
 09/12/2018 Imaging:  XR SPINE CERV 4 OR 5 V Referral Information Referral ID Referred By [facility]  
  
 [id number]   
   [address], [address] Phone: [phone] Fax: [phone] Visits Status Start Date End Date 1 New Request 9/12/18 9/12/19 If your referral has a status of pending review or denied, additional information will be sent to support the outcome of this decision. Patient Instructions Cervical Disc Disease: Care Instructions Your Care Instructions Cervical disc disease results from damage, disease, or wear and tear to the discs between the bones (vertebra) in your neck. The discs act as shock absorbers for the spine and keep the spine flexible. When a disc is damaged, it can bulge out and press against the nerve roots or spinal cord. This is sometimes called a herniated or \"slipped disc. \" This pressure can cause pain and numbness or tingling in your arms and hands. It can also cause weakness in your legs. An accident can damage a disc and cause it to break open (rupture). Aging and hard physical work can also cause damage to cervical discs. The first treatments for cervical disc disease include physical therapy, special neck exercises, heat, and pain medicine. If these fail, your doctor may inject steroids and pain medicine into your neck. Surgery is usually done only if other treatments have not worked. Follow-up care is a key part of your treatment and safety. Be sure to make and go to all appointments, and call your doctor if you are having problems. It's also a good idea to know your test results and keep a list of the medicines you take. How can you care for yourself at home? · Take pain medicines exactly as directed. ¨ If the doctor gave you a prescription medicine for pain, take it as prescribed. ¨ If you are not taking a prescription pain medicine, ask your doctor if you can take an over-the-counter medicine.  
· Don't spend too long in one position. Take short breaks to move around and change positions. · Wear a seat belt and shoulder harness when you are in a car. · Sleep with a pillow under your head and neck that keeps your neck straight. · Follow your doctor's instructions for gentle neck-stretching exercises. · Do not smoke. Smoking can slow healing of your discs. If you need help quitting, talk to your doctor about stop-smoking programs and medicines. These can increase your chances of quitting for good. · Avoid strenuous work or exercise until your doctor says it is okay. When should you call for help? Call 911 anytime you think you may need emergency care. For example, call if: 
  · You are unable to move an arm or a leg at all.  
[facility] your doctor now or seek immediate medical care if: 
  · You have new or worse symptoms in your arms, legs, belly, or buttocks. Symptoms may include: ¨ Numbness or tingling. ¨ Weakness. ¨ Pain.  
  · You lose bladder or bowel control.  
 Watch closely for changes in your health, and be sure to contact your doctor if: 
  · You do not get better as expected. Where can you learn more? Go to http://gretchen-blair.info/. Enter N118 in the search box to learn more about \"Cervical Disc Disease: Care Instructions. \" Current as of: November 29, 2017 Content Version: 11.7 © 9393-5826 Healthwise, Incorporated. Care instructions adapted under license by [employer] (which disclaims liability or warranty for this information). If you have questions about a medical condition or this instruction, always ask your healthcare professional. [place] [address] any warranty or liability for your use of this information. Introducing [facility]! [facility] introduces [employer] patient portal. Now you can access parts of your medical record, email your doctor's office, and request medication refills online.    
 
1. In your internet browser, go to [email or web address] 2. Click on the First Time User? Click Here link in the Sign In box. You will see the New Member Sign Up page. 3. Enter your [employer] Access Code exactly as it appears below. You will not need to use this code after youve completed the sign-up process. If you do not sign up before the expiration date, you must request a new code. · [employer] Access Code: [email or web address] Expires: 9/17/2018  4:27 PM 
 
4. Enter the last four digits of your Social Security Number (xxxx) and Date of Birth (mm/dd/yyyy) as indicated and click Submit. You will be taken to the next sign-up page. 5. Create a Brain in Handt ID. This will be your [employer] login ID and cannot be changed, so think of one that is secure and easy to remember. 6. Create a [employer] password. You can change your password at any time. 7. Enter your Password Reset Question and Answer. This can be used at a later time if you forget your password. 8. Enter your e-mail address. You will receive e-mail notification when new information is available in [address] 9. Click Sign Up. You can now view and download portions of your medical record. 10. Click the Download Summary menu link to download a portable copy of your medical information. If you have questions, please visit the Frequently Asked Questions section of the [employer] website. Remember, [employer] is NOT to be used for urgent needs. For medical emergencies, dial 911. Now available from your iPhone and Android! Please provide this summary of care documentation to your next provider. Your primary care clinician is listed as Moshe Zavaleta. If you have any questions after today's visit, please call [phone].

## 2018-09-12 NOTE — PATIENT INSTRUCTIONS
Cervical Disc Disease: Care Instructions  Your Care Instructions    Cervical disc disease results from damage, disease, or wear and tear to the discs between the bones (vertebra) in your neck. The discs act as shock absorbers for the spine and keep the spine flexible. When a disc is damaged, it can bulge out and press against the nerve roots or spinal cord. This is sometimes called a herniated or \"slipped disc. \" This pressure can cause pain and numbness or tingling in your arms and hands. It can also cause weakness in your legs. An accident can damage a disc and cause it to break open (rupture). Aging and hard physical work can also cause damage to cervical discs. The first treatments for cervical disc disease include physical therapy, special neck exercises, heat, and pain medicine. If these fail, your doctor may inject steroids and pain medicine into your neck. Surgery is usually done only if other treatments have not worked. Follow-up care is a key part of your treatment and safety. Be sure to make and go to all appointments, and call your doctor if you are having problems. It's also a good idea to know your test results and keep a list of the medicines you take. How can you care for yourself at home? · Take pain medicines exactly as directed. ¨ If the doctor gave you a prescription medicine for pain, take it as prescribed. ¨ If you are not taking a prescription pain medicine, ask your doctor if you can take an over-the-counter medicine. · Don't spend too long in one position. Take short breaks to move around and change positions. · Wear a seat belt and shoulder harness when you are in a car. · Sleep with a pillow under your head and neck that keeps your neck straight. · Follow your doctor's instructions for gentle neck-stretching exercises. · Do not smoke. Smoking can slow healing of your discs. If you need help quitting, talk to your doctor about stop-smoking programs and medicines.  These can increase your chances of quitting for good. · Avoid strenuous work or exercise until your doctor says it is okay. When should you call for help? Call 911 anytime you think you may need emergency care. For example, call if:    · You are unable to move an arm or a leg at all.   [facility] your doctor now or seek immediate medical care if:    · You have new or worse symptoms in your arms, legs, belly, or buttocks. Symptoms may include:  ¨ Numbness or tingling. ¨ Weakness. ¨ Pain.     · You lose bladder or bowel control.    Watch closely for changes in your health, and be sure to contact your doctor if:    · You do not get better as expected. Where can you learn more? Go to http://gretchen-blair.info/. Enter N118 in the search box to learn more about \"Cervical Disc Disease: Care Instructions. \"  Current as of: November 29, 2017  Content Version: 11.7  © 9939-6146 [employer]. Care instructions adapted under license by [employer] (which disclaims liability or warranty for this information). If you have questions about a medical condition or this instruction, always ask your healthcare professional. [address] any warranty or liability for your use of this information.

## 2018-09-13 ENCOUNTER — HOME CARE VISIT (OUTPATIENT)
Dept: HOME HEALTH SERVICES | Facility: HOME HEALTH | Age: 48
End: 2018-09-13

## 2018-09-14 ENCOUNTER — TELEPHONE (OUTPATIENT)
Dept: FAMILY MEDICINE CLINIC | Age: 48
End: 2018-09-14

## 2018-09-14 ENCOUNTER — HOME CARE VISIT (OUTPATIENT)
Dept: SCHEDULING | Facility: HOME HEALTH | Age: 48
End: 2018-09-14
Payer: MEDICAID

## 2018-09-14 DIAGNOSIS — M54.50 CHRONIC MIDLINE LOW BACK PAIN WITHOUT SCIATICA: Primary | ICD-10-CM

## 2018-09-14 DIAGNOSIS — G89.29 CHRONIC MIDLINE LOW BACK PAIN WITHOUT SCIATICA: Primary | ICD-10-CM

## 2018-09-14 PROCEDURE — G0151 HHCP-SERV OF PT,EA 15 MIN: HCPCS

## 2018-09-14 PROCEDURE — 400013 HH SOC

## 2018-09-14 RX ORDER — CANE TIPS
EACH MISCELLANEOUS
Qty: 1 DEVICE | Status: SHIPPED | OUTPATIENT
Start: 2018-09-14

## 2018-09-14 NOTE — TELEPHONE ENCOUNTER
Pt is calling in ref to therapist never showing    Pt is requesting a call back also in ref to home -aide paper work  Pt needs cane and walker order   [facility] #  [phone]

## 2018-09-14 NOTE — TELEPHONE ENCOUNTER
Patient returning Clarice's call    Informed patient of Clarice's Note     \"[phone] notified patient via VM prescription for home health aid and cane ready for  at the \"  Patient understands and will  the prescription on Monday

## 2018-09-14 NOTE — TELEPHONE ENCOUNTER
[phone] spoke to patient Therapist did show up and left already     Patient requesting Home aide prescription so she can give it to her  she doesn't want [facility] home health aid  and she also needs prescription order for cane and walker     [phone]  called Elisa Patient at [address] to let her know patient did not want [address] aid Elisa Patient understand

## 2018-09-15 VITALS
TEMPERATURE: 97.3 F | HEART RATE: 67 BPM | SYSTOLIC BLOOD PRESSURE: 120 MMHG | DIASTOLIC BLOOD PRESSURE: 80 MMHG | OXYGEN SATURATION: 98 % | RESPIRATION RATE: 18 BRPM

## 2018-09-17 ENCOUNTER — HOME CARE VISIT (OUTPATIENT)
Dept: SCHEDULING | Facility: HOME HEALTH | Age: 48
End: 2018-09-17

## 2018-09-17 ENCOUNTER — HOME CARE VISIT (OUTPATIENT)
Dept: HOME HEALTH SERVICES | Facility: HOME HEALTH | Age: 48
End: 2018-09-17
Payer: MEDICAID

## 2018-09-19 ENCOUNTER — HOME CARE VISIT (OUTPATIENT)
Dept: SCHEDULING | Facility: HOME HEALTH | Age: 48
End: 2018-09-19
Payer: MEDICAID

## 2018-09-19 VITALS
DIASTOLIC BLOOD PRESSURE: 80 MMHG | RESPIRATION RATE: 18 BRPM | TEMPERATURE: 97.7 F | SYSTOLIC BLOOD PRESSURE: 130 MMHG | HEART RATE: 90 BPM | OXYGEN SATURATION: 97 %

## 2018-09-19 PROCEDURE — G0151 HHCP-SERV OF PT,EA 15 MIN: HCPCS

## 2018-09-20 ENCOUNTER — HOME CARE VISIT (OUTPATIENT)
Dept: HOME HEALTH SERVICES | Facility: HOME HEALTH | Age: 48
End: 2018-09-20
Payer: MEDICAID

## 2018-09-21 RX ORDER — ERGOCALCIFEROL 1.25 MG/1
50000 CAPSULE ORAL
OUTPATIENT
Start: 2018-09-21

## 2018-09-21 RX ORDER — FLUCONAZOLE 150 MG/1
150 TABLET ORAL DAILY
Qty: 1 TAB | Refills: 0 | Status: SHIPPED | OUTPATIENT
Start: 2018-09-21 | End: 2018-09-22

## 2018-09-21 RX ORDER — OXYCODONE HYDROCHLORIDE 30 MG/1
30 TABLET ORAL 3 TIMES DAILY
OUTPATIENT
Start: 2018-09-21

## 2018-09-21 NOTE — TELEPHONE ENCOUNTER
[phone] attempted to call patient no answer left message to call me back in regards to her medication refill

## 2018-09-21 NOTE — TELEPHONE ENCOUNTER
Refilled Diflucan. No recent vitamin D on file. Vitamin D 50, 000 units too high of a dose. Patient can take OTC Vitamin D 1000 units daily. We can provide referral to pain management but I am unable to refill Oxycodone.

## 2018-09-21 NOTE — TELEPHONE ENCOUNTER
Patient is calling in regards to being in pain she states he pain is in her neck and back that's been a lasting condition, medication she is needing for pain is oxyCODONE IR (OXY-IR) 30 mg immediate release tablet  She states she is also needing medication VITAMIN D2 50,000 unit capsule, diflucan     Requested Prescriptions     Pending Prescriptions Disp Refills    oxyCODONE IR (ROXICODONE) 30 mg immediate release tablet       Sig: Take 1 Tab by mouth three (3) times daily. Max Daily Amount: 90 mg.    ergocalciferol (VITAMIN D2) 50,000 unit capsule       Sig: Take 1 Cap by mouth every seven (7) days.  fluconazole (DIFLUCAN) 150 mg tablet 1 Tab 0     Sig: Take 1 Tab by mouth daily for 1 day. FDA advises cautious prescribing of oral fluconazole in pregnancy.      Pharmacy on file verified       [address] [place] [address]  Wednesday, September 12, 2018 02:00 PM     Best call back [phone]

## 2018-09-24 ENCOUNTER — HOME CARE VISIT (OUTPATIENT)
Dept: SCHEDULING | Facility: HOME HEALTH | Age: 48
End: 2018-09-24
Payer: MEDICAID

## 2018-09-24 VITALS
HEART RATE: 85 BPM | OXYGEN SATURATION: 98 % | TEMPERATURE: 97.6 F | DIASTOLIC BLOOD PRESSURE: 88 MMHG | SYSTOLIC BLOOD PRESSURE: 142 MMHG | RESPIRATION RATE: 16 BRPM

## 2018-09-24 PROCEDURE — G0151 HHCP-SERV OF PT,EA 15 MIN: HCPCS

## 2018-09-24 NOTE — TELEPHONE ENCOUNTER
Received message from phone staff inquiring to contact patient in regards to being refused oxycodone and vitamin D 50,000 units. Patient states she also had a high pulse which was not addressed during office visit and no labs were done and no one called her. Informed we attempted to contact her on 9/21/18 and left a message. Informed patient that her pulse has been within normal limits at last office visit. Informed the risks of vitamin D toxicity also informed her recent labs are also recommended to check to see if patient needs high dose prescription. Was not able to discuss oxycodone with patient but patient states she will find a physician outside of [facility].  End of conversation

## 2018-09-26 ENCOUNTER — HOME CARE VISIT (OUTPATIENT)
Dept: SCHEDULING | Facility: HOME HEALTH | Age: 48
End: 2018-09-26
Payer: MEDICAID

## 2018-09-26 VITALS
OXYGEN SATURATION: 98 % | DIASTOLIC BLOOD PRESSURE: 90 MMHG | SYSTOLIC BLOOD PRESSURE: 140 MMHG | RESPIRATION RATE: 16 BRPM | TEMPERATURE: 97.5 F | HEART RATE: 66 BPM

## 2018-09-26 PROCEDURE — G0151 HHCP-SERV OF PT,EA 15 MIN: HCPCS

## 2018-10-19 ENCOUNTER — TELEPHONE (OUTPATIENT)
Dept: FAMILY MEDICINE CLINIC | Age: 48
End: 2018-10-19

## 2018-10-19 NOTE — TELEPHONE ENCOUNTER
Patient is calling stating that she needed the 300 mgs for the   QUEtiapine (SEROQUEL) 200 mg tablet  She states we sent a 200 mgs and she wont be sleeping this week because we sent the wrong doses.   Best call back : [phone]

## 2018-10-20 ENCOUNTER — OFFICE VISIT (OUTPATIENT)
Dept: FAMILY MEDICINE CLINIC | Age: 48
End: 2018-10-20

## 2018-10-20 VITALS
HEART RATE: 89 BPM | TEMPERATURE: 98.2 F | SYSTOLIC BLOOD PRESSURE: 136 MMHG | WEIGHT: 224 LBS | RESPIRATION RATE: 18 BRPM | OXYGEN SATURATION: 98 % | DIASTOLIC BLOOD PRESSURE: 98 MMHG | HEIGHT: 68 IN | BODY MASS INDEX: 33.95 KG/M2

## 2018-10-20 DIAGNOSIS — Z23 ENCOUNTER FOR IMMUNIZATION: ICD-10-CM

## 2018-10-20 DIAGNOSIS — F31.60 BIPOLAR DISORDER, MIXED (HCC): Primary | ICD-10-CM

## 2018-10-20 DIAGNOSIS — N76.0 ACUTE VAGINITIS: ICD-10-CM

## 2018-10-20 RX ORDER — QUETIAPINE FUMARATE 300 MG/1
300 TABLET, FILM COATED ORAL
Qty: 30 TAB | Refills: 3 | Status: SHIPPED | OUTPATIENT
Start: 2018-10-20 | End: 2019-02-12 | Stop reason: SDUPTHER

## 2018-10-20 NOTE — PATIENT INSTRUCTIONS
Main Office  [address] [facility], [address] [facility]      Intake - (All Locations)  [phone]      Main Office Phone  [phone]

## 2018-10-20 NOTE — PROGRESS NOTES
Assessment/Plan:     Diagnoses and all orders for this visit:    1. Bipolar disorder, mixed (HCC)  -     QUEtiapine (SEROQUEL) 300 mg tablet; Take 1 Tab by mouth nightly. She will back down to 300mg QHS. Referred to psychiatry and she understands refills must come from psychiatry at this time. 2. Encounter for immunization  -     INFLUENZA VIRUS VAC QUAD,SPLIT,PRESV FREE SYRINGE IM    3. Acute vaginitis  Likely atrophic vaginitis. Discussed treatment otc. If no improvement, she will return for Nuswab. Follow-up Disposition:  Return if symptoms worsen or fail to improve. Discussed expected course/resolution/complications of diagnosis in detail with patient.    Medication risks/benefits/costs/interactions/alternatives discussed with patient.    Pt was given after visit summary which includes diagnoses, current medications & vitals. Pt expressed understanding with the diagnosis and plan          Subjective:      Heaven Barnes is a 51 y.o. female who presents for had concerns including Medication Evaluation (patient taking seroquel 200 mg and per patient she's getting immune to it and need medication increase); Yeast Infection (wants medication for diflucan and wants to take 2 tab ); and Immunization/Injection (patient wants flu shot ). History of Bipolar Disorder and history of insomnia. Has been hospitalized in 2014 for exacerbation. Has recently started going back to [facility] weekly as she has been dealing with some depression. Has failed several other therapies in the past including Depakote, Abilify, Lithium. Has been doubling her Seroquel with improvement in her sleep. Has not established with psychiatry. Vaginitis  Patient complains of an abnormal vaginal discharge for 1 month. Vaginal symptoms include discharge described as normal and physiologic. Vulvar symptoms include local irritation. STI Risk: Very low risk of STD exposureDischarge described as: normal and physiologic. Other associated symptoms: History of cancer she says cervical but documented as uterine. Status post total hysterectomy. Menstrual pattern: none Contraception: none        Current Outpatient Medications   Medication Sig Dispense Refill    QUEtiapine (SEROQUEL) 300 mg tablet Take 1 Tab by mouth nightly. 30 Tab 3    meloxicam (MOBIC) 7.5 mg tablet Take 7.5 mg by mouth daily.  MULTI-VITAMIN WITH IRON PO Take 1 Tab by mouth daily. Sadie dennis For use as needed 1 Device prn    pantoprazole (PROTONIX) 40 mg tablet       loratadine (CLARITIN) 10 mg tablet Take 1 Tab by mouth daily. 30 Tab 11    albuterol (PROVENTIL HFA, VENTOLIN HFA, PROAIR HFA) 90 mcg/actuation inhaler Take 1-2 Puffs by inhalation every four (4) hours as needed for Wheezing. 1 Inhaler 5    amLODIPine (NORVASC) 10 mg tablet Take 1 Tab by mouth daily. For blood pressure 30 Tab 5    triamterene-hydroCHLOROthiazide (DYAZIDE) 37.5-25 mg per capsule Take 1 Cap by mouth daily. For blood pressure 30 Cap 5    Dexlansoprazole (DEXILANT) 60 mg CpDB Take 1 Cap by mouth daily. For acid reflux 30 Cap 5    tiotropium (SPIRIVA WITH HANDIHALER) 18 mcg inhalation capsule Take 1 Cap by inhalation daily. 30 Cap 5    gabapentin (NEURONTIN) 300 mg capsule Take 1 Cap by mouth two (2) times a day. 60 Cap 5    oxyCODONE IR (OXY-IR) 30 mg immediate release tablet Take 30 mg by mouth three (3) times daily.  VITAMIN D2 50,000 unit capsule Take 50,000 Units by mouth every seven (7) days. Allergies   Allergen Reactions    Codeine Swelling     Tongue swells;  Patient has tolerated Percocet and Morphine without adverse effects  Oxymorphone and oxycodone are home meds    Ambien [Zolpidem] Other (comments)     \"Sleep driving\"    Compazine [Prochlorperazine Edisylate] Swelling     Tongue swells    Lisinopril Swelling     Tongue swells      Peanut Nausea and Vomiting     PEANUTS IN SHELL - SEVERE N&V       ROS:   Review of Systems   Constitutional: Negative for fever. Respiratory: Negative for shortness of breath. Cardiovascular: Negative for chest pain. Gastrointestinal: Negative for abdominal pain. Psychiatric/Behavioral: Positive for depression. The patient has insomnia. The patient is not nervous/anxious. Objective:     Visit Vitals  BP (!) 136/98 (BP 1 Location: Right arm, BP Patient Position: Sitting)   Pulse 89   Temp 98.2 °F (36.8 °C) (Oral)   Resp 18   Ht 5' 8\" (1.727 m)   Wt 224 lb (101.6 kg)   LMP 08/04/2013   SpO2 98%   BMI 34.06 kg/m²       Vitals and Nurse Documentation reviewed. Physical Exam   Constitutional: No distress. Cardiovascular: S1 normal and S2 normal. Exam reveals no gallop and no friction rub. No murmur heard. Pulmonary/Chest: Breath sounds normal. No respiratory distress. Skin: Skin is warm and dry.    Psychiatric: Mood and affect normal.

## 2018-10-20 NOTE — PROGRESS NOTES
Chief Complaint   Patient presents with    Medication Evaluation     patient taking seroquel 200 mg and per patient she's getting immune to it and need medication increase    Yeast Infection     wants medication for diflucan and wants to take 2 tab     Immunization/Injection     patient wants flu shot      Wants Flu shot  Patient denies egg allergy, never had reaction to flu injection in past, not allergic thimerosol mercury based component, denies pregnancy, never had Guillain Barr's syndrome , denies fever. Signed form and scan      Identified pt with two pt identifiers (Name @ )    1. Have you been to the ER, urgent care clinic since your last visit? Hospitalized since your last visit? No    2. Have you seen or consulted any other health care providers outside of the [address] [place] [address] since your last visit? Include any pap smears or colon screening.  No     \"REVIEWED RECORD IN PREPARATION FOR VISIT AND HAVE OBTAINED NECESSARY DOCUMENTATION\"

## 2018-11-28 ENCOUNTER — TELEPHONE (OUTPATIENT)
Dept: FAMILY MEDICINE CLINIC | Age: 48
End: 2018-11-28

## 2018-11-29 NOTE — TELEPHONE ENCOUNTER
----- Message from David Blackwell sent at 11/29/2018  3:49 PM EST -----  Regarding: SUDHAKAR Calle/ Telephone   Contact: [phone]  Pt requesting is requesting a Rx Rollator, shower chair and wheel chair to be sent to [facility] at [phone]

## 2018-11-29 NOTE — TELEPHONE ENCOUNTER
[phone] attempted to call patient no answer needs to find out fax number for [place] and to call me back

## 2018-12-11 ENCOUNTER — TELEPHONE (OUTPATIENT)
Dept: FAMILY MEDICINE CLINIC | Age: 48
End: 2018-12-11

## 2018-12-11 NOTE — TELEPHONE ENCOUNTER
Patient is calling stating that her insurance will not cover for the wheelchair, shower chair, walker. Patient now is requesting a order for a Tub exchanger, RX Rollator to be sent to [address] [place] [address].     Best callback: [phone]    LOV:  Saturday, October 20, 2018

## 2018-12-14 NOTE — TELEPHONE ENCOUNTER
Patient is requesting a status on her medical supplies,  She doesn't need the wheel chair or shower chair, just need, Tub exchanger, RX Rollator to be sent to [employer].     [employer] Phone : [phone]  Fax : [phone]    Patients best call back : [phone]

## 2018-12-18 NOTE — TELEPHONE ENCOUNTER
Orders signed and ready, faxed to [facility] as requested. Called and made aware. Confirmation received.

## 2019-01-10 DIAGNOSIS — K21.9 GASTROESOPHAGEAL REFLUX DISEASE WITHOUT ESOPHAGITIS: ICD-10-CM

## 2019-01-10 RX ORDER — DEXLANSOPRAZOLE 60 MG/1
CAPSULE, DELAYED RELEASE ORAL
Qty: 30 CAP | Refills: 5 | Status: SHIPPED | OUTPATIENT
Start: 2019-01-10 | End: 2019-02-19 | Stop reason: SDUPTHER

## 2019-01-21 ENCOUNTER — TELEPHONE (OUTPATIENT)
Dept: FAMILY MEDICINE CLINIC | Age: 49
End: 2019-01-21

## 2019-01-21 NOTE — TELEPHONE ENCOUNTER
----- Message from Shirin Arreaga sent at 1/21/2019  2:43 PM EST -----  Regarding: SUDHAKAR Calle/ Telephone  Emanuel Avalos, with [facility] would like to clarify what mg of Quetiapine the pt is taking, She see's an active 200mg and 300mg for the pt.  Phone: [phone]

## 2019-01-25 NOTE — TELEPHONE ENCOUNTER
----- Message from Meryle Jeans sent at 11/28/2018  3:57 PM EST -----  Regarding: SUDHAKAR Calle/Telephone  Pt requesting orders for wheelchair, shower chair and a walker per [employer]. Best contact [phone]. Physical Therapy Daily Treatment     Visit Count: 19  Plan of Care Dates:10/1/2018 Through: 12/24/2018  Insurance Information:     PAYOR: MEDICARE  AUTHORIZATION NEEDED: NO, FOLLOW MEDICARE GUIDELINES  THRESHOLD AMOUNT: $2010.00   MET:$0      Referred by: Jania Oliva PA-C; Next provider visit (if known/scheduled):  Medical Diagnosis (from order):    V45.89 (ICD-9-CM) - Z98.890 (ICD-10-CM) - Status post tendon repair   719.45 (ICD-9-CM) - M25.551 (ICD-10-CM) - Right hip pain   Treatment Diagnosis: hip symptoms with increased pain/symptoms, impaired strength, impaired muscle length/flexibility, impaired mobility, impaired balance     Date of Surgery: Oct 2017; Surgery performed: right hip abductor repair; Physician Guidelines yes  Diagnosis Precautions: July 2018  Chart reviewed at time of initial evaluation (relevant co-morbidities, allergies, tests and medications listed): right grade 1 abductor repair; history of left grade 3 abductor repair, GERD, right partial knee replacement (2010), right femur biopsy - osteoblastoma (2000)      SUBJECTIVE   Notices she can go back further with the quad stretch. Still gets some numbness, not as often.   Current Pain: 0/10 currently.    Functional Change: two collapsing episode without the amplitude it used to be. Lumps less, still present. Medial tibial tenderness still present.      OBJECTIVE       Palpation:   With proximal work at the pubic symphysis patient had reproduction of her anterior thigh pain.  Particularly limited around the femoral nerve    Treatment   Neuromuscular Re-education:   Reviewed stretches and progression of activity     Manual Therapy:   soft tissue mobilization with patient in supine: focused along the superior pubic arch. Worked particularly at the femoral nerve location, clearing adductors and quad musculature for mobility and lengthening.  Trigger point manual releases for vastus medialis proximally.          Current Home Program (not  performed this date except as noted above):   *hip alphabet - standing reviewed and practiced each leg  *weight shifting  *[place] stretch   *step up, slow lowering  *quad stretch    ASSESSMENT   Continued improvement with mobility and  Function of the leg at home. Patient had lateral thigh numbness with manual work briefly today (at the femoral nerve location).     Pain after treatment: no pain/10  Result of above outlined education: Verbalizes understanding and Needs reinforcement    Goals:       1. Patient will be independent with progressed and modified home exercise program   2. Decrease pain/symptoms to 0/10  3. Improve involved strength to 5/5  through improvements listed above patient will:  4. Be able to ambulate community distances on even and uneven terrain independently without pain  5. Be able to ambulate for 2+ miles with minimal pain/difficulty  6. Be able to ascend and descend 25 steps using reciprocal pattern with minimal pain/difficulty  7. Lower Extremity Functional Scale: Patient will complete form to reflect an improved score from initial score of 42 to greater than or equal to 51 (0=extreme difficulty; 80=no difficulty) to indicate pt reported improvement in function/disability/impairment (minimal detectable change: 9 points).      PLAN   Continue with therapy, monitor pes anserine muscles. Consider check on gluteals and timing in standing/ walking. No change in plan      THERAPY DAILY BILLING   Primary Insurance:  MEDICARE  Secondary Insurance: [employer]    Evaluation Procedures:  No evaluation codes were used on this date of service    Timed Procedures:  Manual Therapy, 30 minutes    Untimed Procedures:  No untimed codes were used on this date of service    Total Treatment Time: 30 minutes        G-Code:  G-Code Score ABN form  reporting not required this treatment session  Modifier based on outcome measure(s)/functional testing/clinical judgement as listed above    The referring provider's  electronic or written signature on the evaluation authorizes the therapy plan of care and certifies the need for these services, furnished under this plan of care while under their care.  Referring provider signature on file

## 2019-02-12 DIAGNOSIS — I10 ESSENTIAL HYPERTENSION: ICD-10-CM

## 2019-02-12 DIAGNOSIS — F31.60 BIPOLAR DISORDER, MIXED (HCC): ICD-10-CM

## 2019-02-13 RX ORDER — TRIAMTERENE AND HYDROCHLOROTHIAZIDE 37.5; 25 MG/1; MG/1
CAPSULE ORAL
Qty: 30 CAP | Refills: 5 | Status: SHIPPED | OUTPATIENT
Start: 2019-02-13 | End: 2019-04-10 | Stop reason: SDUPTHER

## 2019-02-13 RX ORDER — QUETIAPINE FUMARATE 300 MG/1
TABLET, FILM COATED ORAL
Qty: 30 TAB | Refills: 3 | Status: SHIPPED | OUTPATIENT
Start: 2019-02-13 | End: 2019-04-11 | Stop reason: SDUPTHER

## 2019-02-14 ENCOUNTER — TELEPHONE (OUTPATIENT)
Dept: FAMILY MEDICINE CLINIC | Age: 49
End: 2019-02-14

## 2019-02-19 DIAGNOSIS — K21.9 GASTROESOPHAGEAL REFLUX DISEASE WITHOUT ESOPHAGITIS: ICD-10-CM

## 2019-02-19 NOTE — TELEPHONE ENCOUNTER
Pt. called in today requesting a 90-days supply refill on the following meds. Pharm on file verified     LOV 10/20/2018    Requested Prescriptions     Pending Prescriptions Disp Refills    dexlansoprazole (DEXILANT) 60 mg CpDB capsule (delayed release) 30 Cap 5    meloxicam (MOBIC) 7.5 mg tablet       Sig: Take 1 Tab by mouth daily.

## 2019-02-20 ENCOUNTER — TELEPHONE (OUTPATIENT)
Dept: FAMILY MEDICINE CLINIC | Age: 49
End: 2019-02-20

## 2019-02-20 DIAGNOSIS — J41.0 SIMPLE CHRONIC BRONCHITIS (HCC): ICD-10-CM

## 2019-02-20 DIAGNOSIS — I10 ESSENTIAL HYPERTENSION: ICD-10-CM

## 2019-02-20 RX ORDER — LORATADINE 10 MG/1
10 TABLET ORAL DAILY
Qty: 90 TAB | Refills: 0 | Status: SHIPPED | OUTPATIENT
Start: 2019-02-20 | End: 2019-05-18 | Stop reason: SDUPTHER

## 2019-02-20 RX ORDER — ERGOCALCIFEROL 1.25 MG/1
50000 CAPSULE ORAL
Qty: 12 CAP | Refills: 0 | Status: SHIPPED | OUTPATIENT
Start: 2019-02-20 | End: 2019-05-16 | Stop reason: SDUPTHER

## 2019-02-20 RX ORDER — AMLODIPINE BESYLATE 10 MG/1
10 TABLET ORAL DAILY
Qty: 90 TAB | Refills: 0 | Status: SHIPPED | OUTPATIENT
Start: 2019-02-20 | End: 2019-05-18 | Stop reason: SDUPTHER

## 2019-02-20 RX ORDER — MELOXICAM 7.5 MG/1
7.5 TABLET ORAL DAILY
Qty: 90 TAB | Refills: 0 | Status: SHIPPED | OUTPATIENT
Start: 2019-02-20 | End: 2019-05-18 | Stop reason: SDUPTHER

## 2019-02-20 RX ORDER — DEXLANSOPRAZOLE 60 MG/1
CAPSULE, DELAYED RELEASE ORAL
Qty: 90 CAP | Refills: 1 | Status: SHIPPED | OUTPATIENT
Start: 2019-02-20 | End: 2019-04-11 | Stop reason: SDUPTHER

## 2019-02-20 NOTE — TELEPHONE ENCOUNTER
PA sent to [facility] and its pending     I tried to call  [address] [phone] but I was given extension option just menu

## 2019-02-20 NOTE — TELEPHONE ENCOUNTER
Pt. called in today requesting a 90-days supply refill on the following meds. Pharm on file verified. LOV 10/20/2018    Requested Prescriptions     Pending Prescriptions Disp Refills    loratadine (CLARITIN) 10 mg tablet 30 Tab 11     Sig: Take 1 Tab by mouth daily.  tiotropium (SPIRIVA WITH HANDIHALER) 18 mcg inhalation capsule 30 Cap 5     Sig: Take 1 Cap by inhalation daily.  amLODIPine (NORVASC) 10 mg tablet 30 Tab 5     Sig: Take 1 Tab by mouth daily. For blood pressure    ergocalciferol (VITAMIN D2) 50,000 unit capsule       Sig: Take 1 Cap by mouth every seven (7) days. Patient stated that [facility] [address]

## 2019-02-20 NOTE — TELEPHONE ENCOUNTER
Arabella Kramer is calling from patient Kylee Ramos stating that they have not recived a request for a prior auth for the following RX: DEXILANT 60 mg CpDB capsule (delayed release) [    If you can please submit the request so that they can process the prior auth so the atient can get the meds.        Best contact # [phone]    Ext [phone]

## 2019-02-27 NOTE — TELEPHONE ENCOUNTER
Patient is calling stating that she called her [address] today and they still have not received the PA for the medication. DEXILANT 60 mg CpDB capsule.        Best callback:  [phone]

## 2019-02-27 NOTE — TELEPHONE ENCOUNTER
Called patient insurance and per insurance still pending     [phone] notified patient PA still pending patient understand

## 2019-03-28 ENCOUNTER — TELEPHONE (OUTPATIENT)
Dept: FAMILY MEDICINE CLINIC | Age: 49
End: 2019-03-28

## 2019-03-28 NOTE — TELEPHONE ENCOUNTER
[phone] spoke to patient notified she needs office visit per patient she's still in the hospital advised to call us back after she gets out of hospital to follow up with SUDHAKAR Calle patient understand

## 2019-03-28 NOTE — TELEPHONE ENCOUNTER
----- Message from Otis Carlisle sent at 3/28/2019  1:36 PM EDT -----  Regarding: SUDHAKAR Calle/Telephone  Pt requesting a call back in regards to Rx for a hospital bed. Pt is also reporting that she has been admitted to [facility] on 3/23/19 for sepsis, pneumonia caused by GI tract leak. Best contact [phone] or at the hospital [phone].

## 2019-04-05 DIAGNOSIS — K21.9 GASTROESOPHAGEAL REFLUX DISEASE WITHOUT ESOPHAGITIS: ICD-10-CM

## 2019-04-05 RX ORDER — DEXLANSOPRAZOLE 60 MG/1
CAPSULE, DELAYED RELEASE ORAL
Qty: 90 CAP | Refills: 1 | Status: CANCELLED | OUTPATIENT
Start: 2019-04-05

## 2019-04-05 NOTE — TELEPHONE ENCOUNTER
Patient is requesting refill for the 90 day supply  .   Requested Prescriptions     Pending Prescriptions Disp Refills    dexlansoprazole (DEXILANT) 60 mg CpDB capsule (delayed release) 90 Cap 1     Sig: TAKE 1 CAPSULE BY MOUTH ONCE DAILY FOR ACID  REFLUX     Last refill : 2/20/2019  Pharmacy verified  Last seen :October 20, 2018 with Caren  (Patient requesting the refill request to be sent to [facility])

## 2019-04-05 NOTE — TELEPHONE ENCOUNTER
[phone] [facility] called patient medication was refilled 2/2019     [phone] attempted to call no answer left message advised [facility]

## 2019-04-08 DIAGNOSIS — K21.9 GASTROESOPHAGEAL REFLUX DISEASE WITHOUT ESOPHAGITIS: ICD-10-CM

## 2019-04-08 RX ORDER — DEXLANSOPRAZOLE 60 MG/1
CAPSULE, DELAYED RELEASE ORAL
Qty: 90 CAP | Refills: 1 | Status: CANCELLED | OUTPATIENT
Start: 2019-04-08

## 2019-04-08 NOTE — TELEPHONE ENCOUNTER
Pt is calling to request a refill for the following Rx: . Katherene Means   Requested Prescriptions     Pending Prescriptions Disp Refills    dexlansoprazole (DEXILANT) 60 mg CpDB capsule (delayed release) 90 Cap 1     Sig: TAKE 1 CAPSULE BY MOUTH ONCE DAILY FOR ACID  REFLUX         LOV 10/20/19

## 2019-04-08 NOTE — TELEPHONE ENCOUNTER
[phone] attempted to call patient but its a wrong     [phone] attempted to call patient notified via VM that prescription was at the pharmacy and to call her pharmacy

## 2019-04-09 ENCOUNTER — TELEPHONE (OUTPATIENT)
Dept: FAMILY MEDICINE CLINIC | Age: 49
End: 2019-04-09

## 2019-04-09 NOTE — TELEPHONE ENCOUNTER
----- Message from Annette Dunn sent at 4/9/2019 12:50 PM EDT -----  Regarding: SUDHAKAR Fabian/Telephone  Pt  would to follow-up on the referral for Dr. Chuck Chua office. Pt would also like to request a prescription of \"dexilant 300 mg and triamterene 37.5 mg\". Pharmacy is [facility] ([facility]oll[facility]) on file. Best contact number is [phone].

## 2019-04-10 DIAGNOSIS — I10 ESSENTIAL HYPERTENSION: ICD-10-CM

## 2019-04-10 RX ORDER — TRIAMTERENE AND HYDROCHLOROTHIAZIDE 37.5; 25 MG/1; MG/1
CAPSULE ORAL
Qty: 90 CAP | Refills: 0 | Status: SHIPPED | OUTPATIENT
Start: 2019-04-10 | End: 2019-07-14 | Stop reason: SDUPTHER

## 2019-04-10 NOTE — TELEPHONE ENCOUNTER
Pharmacy requesting medication refill     Requested Prescriptions     Pending Prescriptions Disp Refills    triamterene-hydroCHLOROthiazide (DYAZIDE) 37.5-25 mg per capsule 30 Cap 5     Pharmacy on file verified  ([facility] [phone])    Pharmacy Comments Previous RX is , pt request new rx

## 2019-04-10 NOTE — TELEPHONE ENCOUNTER
Patient is calling stating that she needs a prior of on the RX dexlansoprazole (DEXILANT) 60 mg CpDB capsule (delayed release). Pharmacy has faxed over a PA form.        Best callback:  [phone]        LOV:  Saturday, October 20, 2018  UOV:  Thursday, April 11, 2019

## 2019-04-11 ENCOUNTER — TELEPHONE (OUTPATIENT)
Dept: FAMILY MEDICINE CLINIC | Age: 49
End: 2019-04-11

## 2019-04-11 ENCOUNTER — OFFICE VISIT (OUTPATIENT)
Dept: FAMILY MEDICINE CLINIC | Age: 49
End: 2019-04-11

## 2019-04-11 VITALS
TEMPERATURE: 98 F | BODY MASS INDEX: 35.61 KG/M2 | RESPIRATION RATE: 18 BRPM | WEIGHT: 235 LBS | DIASTOLIC BLOOD PRESSURE: 76 MMHG | SYSTOLIC BLOOD PRESSURE: 115 MMHG | HEART RATE: 78 BPM | HEIGHT: 68 IN | OXYGEN SATURATION: 96 %

## 2019-04-11 DIAGNOSIS — Z74.09 LIMITED MOBILITY: ICD-10-CM

## 2019-04-11 DIAGNOSIS — M54.50 CHRONIC BILATERAL LOW BACK PAIN WITHOUT SCIATICA: ICD-10-CM

## 2019-04-11 DIAGNOSIS — G89.29 CHRONIC BILATERAL LOW BACK PAIN WITHOUT SCIATICA: ICD-10-CM

## 2019-04-11 DIAGNOSIS — F31.60 BIPOLAR DISORDER, MIXED (HCC): ICD-10-CM

## 2019-04-11 DIAGNOSIS — J69.0 ASPIRATION PNEUMONIA, UNSPECIFIED ASPIRATION PNEUMONIA TYPE, UNSPECIFIED LATERALITY, UNSPECIFIED PART OF LUNG (HCC): ICD-10-CM

## 2019-04-11 DIAGNOSIS — R41.3 MEMORY LOSS: Primary | ICD-10-CM

## 2019-04-11 DIAGNOSIS — K21.9 GASTROESOPHAGEAL REFLUX DISEASE WITHOUT ESOPHAGITIS: ICD-10-CM

## 2019-04-11 DIAGNOSIS — F19.91 HISTORY OF DRUG USE: ICD-10-CM

## 2019-04-11 RX ORDER — QUETIAPINE FUMARATE 300 MG/1
300 TABLET, FILM COATED ORAL
Qty: 30 TAB | Refills: 1 | Status: SHIPPED | OUTPATIENT
Start: 2019-04-11 | End: 2021-04-21 | Stop reason: ALTCHOICE

## 2019-04-11 RX ORDER — DEXLANSOPRAZOLE 60 MG/1
CAPSULE, DELAYED RELEASE ORAL
Qty: 90 CAP | Refills: 1 | Status: SHIPPED | OUTPATIENT
Start: 2019-04-11 | End: 2019-07-09 | Stop reason: SDUPTHER

## 2019-04-11 NOTE — TELEPHONE ENCOUNTER
----- Message from Anita Stoll sent at 4/11/2019  5:12 PM EDT -----  Regarding: jose Fabian NP/ telephone  Pt would like the referall written for Dr Nat Ribeiro [phone] fax [phone] for Pain management.  Pts contact  [phone]

## 2019-04-11 NOTE — TELEPHONE ENCOUNTER
[phone] spoke to [phone] [facility] at patient insurance and fixed the PA for [address] [place]   Per insurance patient can get Dexilant 30 tablets per 30 days she cant get 90 days   Attempted to call patients pharmacy [facility] but I was on hold for a long time    [phone] attempted to call patient no answer left message on her VM Per insurance patient can get Dexilant 30 tablets per 30 days she cant get 90 days   And to call her pharmacy and to call me back with question

## 2019-04-11 NOTE — PROGRESS NOTES
Chief Complaint   Patient presents with    Medication Refill    Referral Follow Up     pain management    Other     order for hospital bed     1. Have you been to the ER, urgent care clinic since your last visit? Hospitalized since your last visit? Patient was admitted to [facility] for chest congestion on 3/30 to 4/4.    2. Have you seen or consulted any other health care providers outside of the [address] [place] [address] since your last visit? Include any pap smears or colon screening.  No

## 2019-04-11 NOTE — PROGRESS NOTES
Assessment/Plan:     Diagnoses and all orders for this visit:    1. Memory loss  MMSE 25/30. History of drug use. Consider MRI Brain if worsening. 2. Chronic bilateral low back pain without sciatica  Referred to Dr. Alice Tyson per her request.  She has declined surgical intervention. She is significantly limited in her motor function. 3. History of drug use  She reports 15 year sobriety however positive UDS in 2016 for cocaine, so she is not very forthcoming regarding her use or lacks insight. 4. Aspiration pneumonia, unspecified aspiration pneumonia type, unspecified laterality, unspecified part of lung ([facility] [address]  Will obtain records as specifics are not clear with patient's recount today. Likely needs follow up xray in one month. 5. Bipolar disorder, mixed (HCC)  -     QUEtiapine (SEROQUEL) 300 mg tablet; Take 1 Tab by mouth nightly. This is our last refill of her psychiatric medications. Must establish with psychiatry in June. 6. Gastroesophageal reflux disease without esophagitis  -     dexlansoprazole (DEXILANT) 60 mg CpDB capsule (delayed release); TAKE 1 CAPSULE BY MOUTH ONCE DAILY FOR ACID  REFLUX  Stable. Refilled today. Continue current therapy. 7. Limited mobility  She will require a hospital bed. We will work towards this. Discussed expected course/resolution/complications of diagnosis in detail with patient.    Medication risks/benefits/costs/interactions/alternatives discussed with patient.    Pt was given after visit summary which includes diagnoses, current medications & vitals. Pt expressed understanding with the diagnosis and plan          Subjective:      Lito Levine is a 51 y.o. female who presents for had concerns including Medication Refill; Referral Follow Up (pain management); and Other (order for hospital bed). She was previously referred to psychiatry. She missed her appointment and has rescheduled for June. She cannot recall the provider. She is in need of refills today. She is interested in referral to Dr. Nirmala Junior for pain management. She reports a history of chronic pain. Sites are left shoulder, cervical neck, and lumbar back. She reports symptoms began after a fall during her [employer]. She is living with her daughter. She helps with her basic ADLs. She has a remote history of drug abuse, including cocaine. She reports sobriety for the past 15 years. She is requesting a hospital bed to improve her positioning as her daughter is struggling with her care. She had a recent hospitalization for [facility] two weeks ago for aspiration pneumonia. She is followed by Dr. Jong Pruitt for GERD. She reports vomiting in her sleep with subsequent aspiration that developed into pneumonia and sepsis. Current Outpatient Medications   Medication Sig Dispense Refill    QUEtiapine (SEROQUEL) 300 mg tablet Take 1 Tab by mouth nightly. 30 Tab 1    dexlansoprazole (DEXILANT) 60 mg CpDB capsule (delayed release) TAKE 1 CAPSULE BY MOUTH ONCE DAILY FOR ACID  REFLUX 90 Cap 1    triamterene-hydroCHLOROthiazide (DYAZIDE) 37.5-25 mg per capsule TAKE 1 CAPSULE BY MOUTH ONCE DAILY FOR BLOOD PRESSURE 90 Cap 0    meloxicam (MOBIC) 7.5 mg tablet Take 1 Tab by mouth daily. 90 Tab 0    loratadine (CLARITIN) 10 mg tablet Take 1 Tab by mouth daily. 90 Tab 0    tiotropium (SPIRIVA WITH HANDIHALER) 18 mcg inhalation capsule Take 1 Cap by inhalation daily. 90 Cap 0    amLODIPine (NORVASC) 10 mg tablet Take 1 Tab by mouth daily. For blood pressure 90 Tab 0    ergocalciferol (VITAMIN D2) 50,000 unit capsule Take 1 Cap by mouth every seven (7) days. 12 Cap 0    MULTI-VITAMIN WITH IRON PO Take 1 Tab by mouth daily.  albuterol (PROVENTIL HFA, VENTOLIN HFA, PROAIR HFA) 90 mcg/actuation inhaler Take 1-2 Puffs by inhalation every four (4) hours as needed for Wheezing.  1 Inhaler 5    gabapentin (NEURONTIN) 300 mg capsule Take 1 Cap by mouth two (2) times a day. 60 Cap 5    Cane jeannie For use as needed 1 Device prn       Allergies   Allergen Reactions    Codeine Swelling     Tongue swells; Patient has tolerated Percocet and Morphine without adverse effects  Oxymorphone and oxycodone are home meds    Ambien [Zolpidem] Other (comments)     \"Sleep driving\"    Compazine [Prochlorperazine Edisylate] Swelling     Tongue swells    Lisinopril Swelling     Tongue swells      Peanut Nausea and Vomiting     PEANUTS IN SHELL - SEVERE N&V       ROS:   Review of Systems   Constitutional: Negative for malaise/fatigue. Eyes: Negative for blurred vision. Respiratory: Negative for cough and shortness of breath. Cardiovascular: Negative for chest pain. Gastrointestinal: Positive for heartburn. Negative for nausea and vomiting. Musculoskeletal: Positive for back pain, joint pain and neck pain. Psychiatric/Behavioral: Positive for memory loss. Negative for depression. The patient has insomnia. The patient is not nervous/anxious. Objective:     Visit Vitals  /76   Pulse 78   Temp 98 °F (36.7 °C) (Oral)   Resp 18   Ht 5' 8\" (1.727 m)   Wt 235 lb (106.6 kg)   LMP 08/04/2013   SpO2 96%   BMI 35.73 kg/m²       Vitals and Nurse Documentation reviewed. Physical Exam   Constitutional: No distress. Cardiovascular: S1 normal and S2 normal. Exam reveals no gallop and no friction rub. No murmur heard. Pulmonary/Chest: Breath sounds normal. No respiratory distress. Neurological: She displays weakness. Slow Gait. Uses walker in office today. Skin: Skin is warm and dry.    Psychiatric: Mood, memory, affect and judgment normal.

## 2019-04-15 ENCOUNTER — TELEPHONE (OUTPATIENT)
Dept: FAMILY MEDICINE CLINIC | Age: 49
End: 2019-04-15

## 2019-04-15 ENCOUNTER — PATIENT OUTREACH (OUTPATIENT)
Dept: FAMILY MEDICINE CLINIC | Age: 49
End: 2019-04-15

## 2019-04-15 RX ORDER — FLUCONAZOLE 150 MG/1
150 TABLET ORAL DAILY
Qty: 2 TAB | Refills: 0 | Status: SHIPPED | OUTPATIENT
Start: 2019-04-15 | End: 2019-04-16

## 2019-04-15 NOTE — PROGRESS NOTES
NN contacted patient's insurance, [employer] re need for hospital bed. Given list of DME providers. [facility] and [address]. Was advised that pcp needs to fax Rx for the bed(include clinical info) and face sheet to their office, Fax # [phone]. Information sent to pcp to complete rx.

## 2019-04-15 NOTE — TELEPHONE ENCOUNTER
----- Message from Kimberly Mcdonough sent at 4/15/2019  4:24 PM EDT -----  Regarding: NP Caren/Refill  Pt requesting (2) \"Diflucan\" pills for yeast infection. The pharmacy [facility]. Best contact [phone].

## 2019-04-23 DIAGNOSIS — Z74.09 LIMITED MOBILITY: Primary | ICD-10-CM

## 2019-04-24 NOTE — PROGRESS NOTES
Order for bed faxed to [facility] and [facility] at [phone] on 4/24/2019 @ 9:21 am. Confirmation received.

## 2019-04-25 NOTE — TELEPHONE ENCOUNTER
Outbound call to patient. Patient notified that referral has been placed for Dr. Adrien Thomas office and order for bed was faxed on 4/25/2019 to [facility]. Patient verbalized understanding.

## 2019-04-25 NOTE — TELEPHONE ENCOUNTER
----- Message from Sebas Rebolledo sent at 4/25/2019 12:31 PM EDT -----  Regarding: SUDHAKAR Gonzales Case: [phone]  Pt wants to follow up with Nurse on prescription for \"Bed\" and also wants to know the status of referral to see Dr. Jeana Wayne. Pt best contact is [phone].

## 2019-05-16 RX ORDER — ERGOCALCIFEROL 1.25 MG/1
CAPSULE ORAL
Qty: 12 CAP | Refills: 0 | Status: SHIPPED | OUTPATIENT
Start: 2019-05-16 | End: 2019-08-10 | Stop reason: SDUPTHER

## 2019-06-20 ENCOUNTER — TELEPHONE (OUTPATIENT)
Dept: FAMILY MEDICINE CLINIC | Age: 49
End: 2019-06-20

## 2019-07-08 ENCOUNTER — TELEPHONE (OUTPATIENT)
Dept: FAMILY MEDICINE CLINIC | Age: 49
End: 2019-07-08

## 2019-07-08 DIAGNOSIS — K21.9 GASTROESOPHAGEAL REFLUX DISEASE WITHOUT ESOPHAGITIS: ICD-10-CM

## 2019-07-08 NOTE — TELEPHONE ENCOUNTER
----- Message from Gloria Noel sent at 7/8/2019  1:58 PM EDT -----  Regarding: Chata Fabian/ Telephone   Contact: [phone]  Pt requesting a letter of necessity to send to [facility] and [facility] for a hospital bed and wheelchair. Pt did not have contact information available that would assist in sending the information.

## 2019-07-08 NOTE — TELEPHONE ENCOUNTER
----- Message from [address] sent at 7/8/2019  1:59 PM EDT -----  Regarding: YUMIKO Fabian/Telephone  Contact: [phone]  Pt request a letter of medical necessity for her hospital bed and battery powered wheel chair to be completed by her  PCP and sent to [facility] and [facility] at (p) [phone]. Please advise.

## 2019-07-08 NOTE — TELEPHONE ENCOUNTER
----- Message from Genet Hassan sent at 7/8/2019  1:47 PM EDT -----  Regarding: SUDHAKAR Fabian/Telephone  Medication Refill    Caller (if not patient): n/a      Relationship of caller (if not patient): n/a      Best contact number(s): [phone]      Name of medication and dosage if known:  \"Dexolent\" 6 month supply, pt is also calling regarding a prescription for a battery operated wheelchair      Is patient out of this medication (yes/no):       Pharmacy name: [facility]    Pharmacy listed in chart? (yes/no): Yes  Pharmacy phone number:      Details to clarify the request:      Genet Hassan

## 2019-07-09 RX ORDER — DEXLANSOPRAZOLE 60 MG/1
CAPSULE, DELAYED RELEASE ORAL
Qty: 90 CAP | Refills: 0 | Status: SHIPPED | OUTPATIENT
Start: 2019-07-09 | End: 2019-07-15 | Stop reason: SDUPTHER

## 2019-07-09 NOTE — TELEPHONE ENCOUNTER
----- Message from Shaan Tipton sent at 7/9/2019 11:11 AM EDT -----  Regarding: SUDHAKAR Fabian/Telephone   Pt states she is returning a call regarding her pre authorization, hospital bed and medical necessity. Best contact number is [phone].

## 2019-07-09 NOTE — TELEPHONE ENCOUNTER
Outbound call to patient. Patient made aware that provider is out of the office and will return on next week. Will notify patient if letter of medical necessity is completed.

## 2019-07-12 NOTE — TELEPHONE ENCOUNTER
----- Message from Cher Figueredo sent at 7/12/2019  3:45 PM EDT -----  Regarding: Chata Fabian/ Telephone   Contact: [phone]  Pt requesting a return call regarding Rx Dexilant 60 mg. Authorization was sent over a week ago with no response. Pt has been completely out of medication for a week. Please call to verify this will be sent to the [facility] pharmacy on file.

## 2019-07-14 DIAGNOSIS — I10 ESSENTIAL HYPERTENSION: ICD-10-CM

## 2019-07-14 RX ORDER — TRIAMTERENE AND HYDROCHLOROTHIAZIDE 37.5; 25 MG/1; MG/1
CAPSULE ORAL
Qty: 30 CAP | Refills: 2 | Status: SHIPPED | OUTPATIENT
Start: 2019-07-14 | End: 2019-09-10 | Stop reason: SDUPTHER

## 2019-07-15 RX ORDER — DEXLANSOPRAZOLE 60 MG/1
CAPSULE, DELAYED RELEASE ORAL
Qty: 90 CAP | Refills: 3 | Status: SHIPPED | OUTPATIENT
Start: 2019-07-15 | End: 2019-07-22

## 2019-07-16 ENCOUNTER — TELEPHONE (OUTPATIENT)
Dept: FAMILY MEDICINE CLINIC | Age: 49
End: 2019-07-16

## 2019-07-16 NOTE — TELEPHONE ENCOUNTER
Chief Complaint   Patient presents with    Prior Auth     DEXILANT :  RECEIVED FAX NOTICE OF REJECTED. PRIOR AUTHORIZATION TO BE COMPLETED/ ALTERNATIVE MEDICATION.

## 2019-07-16 NOTE — TELEPHONE ENCOUNTER
PRIOR AUTHORIZATION COMPLETED, AWAITING APPROVAL/DENIAL. LIST OF ALTERNATIVES RECOMMENDED. LANSOPRAZOLE, PANTOPRAZOLE, OMEPRAZOLE AND RABEPRAZOLE SODIUM. Janice Bravo LPN    Patient states she has been using OTC , with no relief at all.   [facility], JENIN

## 2019-07-17 ENCOUNTER — TELEPHONE (OUTPATIENT)
Dept: FAMILY MEDICINE CLINIC | Age: 49
End: 2019-07-17

## 2019-07-18 ENCOUNTER — TELEPHONE (OUTPATIENT)
Dept: FAMILY MEDICINE CLINIC | Age: 49
End: 2019-07-18

## 2019-07-18 NOTE — TELEPHONE ENCOUNTER
----- Message from Beni Wolff sent at 7/18/2019 11:34 AM EDT -----  Regarding: SUDHAKAR Fabian / Telephone  Pt wants to know if meds have been called in for her acid reflux. Pt will  from [facility] pharmacy on file. Pt best contact is [phone].

## 2019-07-22 RX ORDER — OMEPRAZOLE 40 MG/1
40 CAPSULE, DELAYED RELEASE ORAL DAILY
Qty: 90 CAP | Refills: 1 | Status: SHIPPED | OUTPATIENT
Start: 2019-07-22 | End: 2020-06-02

## 2019-07-22 NOTE — TELEPHONE ENCOUNTER
Forms faxed to [facility] and [facility] at [phone] on 7/22/2019 @ 11:45 am. Confirmation recieved

## 2019-07-23 ENCOUNTER — TELEPHONE (OUTPATIENT)
Dept: FAMILY MEDICINE CLINIC | Age: 49
End: 2019-07-23

## 2019-07-23 NOTE — TELEPHONE ENCOUNTER
Outbound call to patient. Patient made aware that Prior Authorization for Dexilant capsules has been denied and the alternative medication was sent to the pharmacy. Patient verbalized understanding and states will give insurance company a call in regards to medication.

## 2019-07-23 NOTE — TELEPHONE ENCOUNTER
Pt called to state that the Prilosec does not work and that she needs to take dexlansoprazole (DEXILANT) 60 mg CpDB capsule (delayed release). Pt would like a call back to discuss this issue.       Best contact # [phone]  LOV 04/11/19

## 2019-07-29 ENCOUNTER — TELEPHONE (OUTPATIENT)
Dept: FAMILY MEDICINE CLINIC | Age: 49
End: 2019-07-29

## 2019-07-29 NOTE — TELEPHONE ENCOUNTER
Pt is calling to see if the doctor sign off and sent over a script for the motorized wheel chair.        LOV  April 11, 2019

## 2019-07-30 NOTE — TELEPHONE ENCOUNTER
I have not seen order set for this? Some other supplies, but motorized wheelchair requires different criteria. What company is she working through?

## 2019-08-06 DIAGNOSIS — F31.60 BIPOLAR DISORDER, MIXED (HCC): ICD-10-CM

## 2019-08-06 RX ORDER — QUETIAPINE FUMARATE 300 MG/1
TABLET, FILM COATED ORAL
Qty: 30 TAB | Refills: 1 | OUTPATIENT
Start: 2019-08-06

## 2019-08-10 RX ORDER — ERGOCALCIFEROL 1.25 MG/1
CAPSULE ORAL
Qty: 4 CAP | Refills: 2 | Status: SHIPPED | OUTPATIENT
Start: 2019-08-10 | End: 2019-09-30 | Stop reason: SDUPTHER

## 2019-08-12 DIAGNOSIS — I10 ESSENTIAL HYPERTENSION: ICD-10-CM

## 2019-08-12 DIAGNOSIS — J41.0 SIMPLE CHRONIC BRONCHITIS (HCC): ICD-10-CM

## 2019-08-12 RX ORDER — AMLODIPINE BESYLATE 10 MG/1
10 TABLET ORAL DAILY
Qty: 30 TAB | Refills: 2 | Status: SHIPPED | OUTPATIENT
Start: 2019-08-12 | End: 2019-09-10 | Stop reason: SDUPTHER

## 2019-08-12 RX ORDER — MELOXICAM 7.5 MG/1
TABLET ORAL
Qty: 30 TAB | Refills: 2 | Status: SHIPPED | OUTPATIENT
Start: 2019-08-12 | End: 2019-09-10 | Stop reason: SDUPTHER

## 2019-08-12 RX ORDER — LORATADINE 10 MG/1
TABLET ORAL
Qty: 30 TAB | Refills: 2 | Status: SHIPPED | OUTPATIENT
Start: 2019-08-12 | End: 2019-09-10 | Stop reason: SDUPTHER

## 2019-08-12 RX ORDER — TIOTROPIUM BROMIDE 18 UG/1
CAPSULE ORAL; RESPIRATORY (INHALATION)
Qty: 30 CAP | Refills: 5 | Status: SHIPPED | OUTPATIENT
Start: 2019-08-12 | End: 2020-02-18 | Stop reason: SINTOL

## 2019-08-19 ENCOUNTER — TELEPHONE (OUTPATIENT)
Dept: FAMILY MEDICINE CLINIC | Age: 49
End: 2019-08-19

## 2019-08-19 NOTE — TELEPHONE ENCOUNTER
----- Message from Conrad Knapp sent at 8/19/2019 11:54 AM EDT -----  Regarding: SUDHAKAR Sahu / [address] [place] [address] Message/Vendor Calls    PT is requesting to speak with the nurse in regard to obtaining an order for a wheelchair.      Callback required     Best contact number(s):  [phone]          Conrad Knapp

## 2019-08-21 NOTE — TELEPHONE ENCOUNTER
Outgoing call to patient. Patient notified that forms were faxed on 7/22/2019. Patient verbalized understanding, and will give [place] [facility] [employer] a call.

## 2019-09-10 DIAGNOSIS — I10 ESSENTIAL HYPERTENSION: ICD-10-CM

## 2019-09-10 RX ORDER — LORATADINE 10 MG/1
TABLET ORAL
Qty: 30 TAB | Refills: 2 | Status: SHIPPED | OUTPATIENT
Start: 2019-09-10 | End: 2019-11-02 | Stop reason: SDUPTHER

## 2019-09-10 RX ORDER — MELOXICAM 7.5 MG/1
TABLET ORAL
Qty: 30 TAB | Refills: 2 | Status: SHIPPED | OUTPATIENT
Start: 2019-09-10 | End: 2019-11-29 | Stop reason: SDUPTHER

## 2019-09-10 RX ORDER — AMLODIPINE BESYLATE 10 MG/1
10 TABLET ORAL DAILY
Qty: 30 TAB | Refills: 2 | Status: SHIPPED | OUTPATIENT
Start: 2019-09-10 | End: 2019-12-29

## 2019-09-10 RX ORDER — TRIAMTERENE AND HYDROCHLOROTHIAZIDE 37.5; 25 MG/1; MG/1
CAPSULE ORAL
Qty: 30 CAP | Refills: 2 | Status: SHIPPED | OUTPATIENT
Start: 2019-09-10 | End: 2019-10-04 | Stop reason: SDUPTHER

## 2019-09-11 ENCOUNTER — OFFICE VISIT (OUTPATIENT)
Dept: FAMILY MEDICINE CLINIC | Age: 49
End: 2019-09-11

## 2019-09-11 VITALS
RESPIRATION RATE: 16 BRPM | HEIGHT: 68 IN | SYSTOLIC BLOOD PRESSURE: 116 MMHG | BODY MASS INDEX: 34.1 KG/M2 | OXYGEN SATURATION: 96 % | HEART RATE: 86 BPM | DIASTOLIC BLOOD PRESSURE: 83 MMHG | WEIGHT: 225 LBS | TEMPERATURE: 98.1 F

## 2019-09-11 DIAGNOSIS — R73.9 HYPERGLYCEMIA: ICD-10-CM

## 2019-09-11 DIAGNOSIS — J41.0 SIMPLE CHRONIC BRONCHITIS (HCC): ICD-10-CM

## 2019-09-11 DIAGNOSIS — E78.2 MIXED HYPERLIPIDEMIA: ICD-10-CM

## 2019-09-11 DIAGNOSIS — E66.01 MORBID OBESITY (HCC): ICD-10-CM

## 2019-09-11 DIAGNOSIS — Z72.0 TOBACCO ABUSE: ICD-10-CM

## 2019-09-11 DIAGNOSIS — R41.3 MEMORY LOSS: ICD-10-CM

## 2019-09-11 DIAGNOSIS — R53.1 LEFT-SIDED WEAKNESS: Primary | ICD-10-CM

## 2019-09-11 DIAGNOSIS — I10 ESSENTIAL HYPERTENSION: ICD-10-CM

## 2019-09-11 DIAGNOSIS — Z13.220 SCREENING, LIPID: ICD-10-CM

## 2019-09-11 RX ORDER — DIVALPROEX SODIUM 250 MG/1
TABLET, DELAYED RELEASE ORAL DAILY
COMMUNITY
Start: 2019-09-09

## 2019-09-11 NOTE — PROGRESS NOTES
Chief Complaint   Patient presents with    Referral Follow Up     1. Have you been to the ER, urgent care clinic since your last visit? Hospitalized since your last visit? No    2. Have you seen or consulted any other health care providers outside of the [address] [place] [address] since your last visit? Include any pap smears or colon screening.  No

## 2019-09-11 NOTE — PROGRESS NOTES
Assessment/Plan:     Diagnoses and all orders for this visit:    1. Left-sided weakness  -     MRI BRAIN W WO CONT; Future    2. Tobacco abuse  -     MRI BRAIN W WO CONT; Future  I continue to encourage cessation. She is precontemplative. 3. Morbid obesity ([address]  I have reviewed/discussed the above normal BMI with the patient. I have recommended the following interventions: dietary management education, guidance, and counseling, encourage exercise, monitor weight and prescribed dietary intake. Given written instructions as well. 4. Simple chronic bronchitis ([address]  Stable off therapy at this time but continues with tobacco use. 5. Memory loss  -     MRI BRAIN W WO CONT; Future  -     TSH 3RD GENERATION  -     VITAMIN B12    6. Essential hypertension  -     METABOLIC PANEL, COMPREHENSIVE  -     MICROALBUMIN, UR, RAND W/ MICROALB/CREAT RATIO  Continue current therapy. 7. Hyperglycemia  -     HEMOGLOBIN A1C WITH EAG    8. Screening, lipid  -     LIPID PANEL        Follow-up and Dispositions    · Return in about 3 months (around 12/11/2019) for Follow Up. Discussed expected course/resolution/complications of diagnosis in detail with patient. Medication risks/benefits/costs/interactions/alternatives discussed with patient. Pt was given after visit summary which includes diagnoses, current medications & vitals. Pt expressed understanding with the diagnosis and plan          Subjective:      Luther Dodd is a 47 y.o. female who presents for had concerns including Referral Follow Up. She reports a worsening history of left sided weakness and tingling. Her daughter has voiced concerns about her vision. Does not sometimes see objects directly in front of her. She reports her memory is worsening and specifically recalling short term memory has worsened. She is currently working with physical therapy. She has not seen improvement in left sided weakness.  There is a current tobacco use.  There is a recent history of crack cocaine use one year ago. She is followed by Dr. Jaquelin Hopper. She is due for colonoscopy. She is followed by ob/gyn and will schedule her mammogram.             Patient Active Problem List   Diagnosis Code    Morbid obesity ([facility] [address] E66.01    Hypertension I10    Depression F32.9    GERD (gastroesophageal reflux disease) K21.9    Arthritis M19.90    Chronic low back pain M54.5, G89.29    Bilateral chronic knee pain M25.561, M25.562, G89.29    Simple chronic bronchitis (HCC) J41.0    Tobacco use Z72.0    Other hyperlipidemia E78.49    Bipolar disorder, mixed ([facility]) F31.60       Current Outpatient Medications   Medication Sig Dispense Refill    divalproex DR (DEPAKOTE) 250 mg tablet       triamterene-hydroCHLOROthiazide (DYAZIDE) 37.5-25 mg per capsule TAKE 1 CAPSULE BY MOUTH ONCE DAILY FOR BLOOD PRESSURE *LAST REFILL WITHOUT APPOINTMENT* 30 Cap 2    loratadine (CLARITIN) 10 mg tablet TAKE 1 TABLET BY MOUTH EVERY DAY 30 Tab 2    meloxicam (MOBIC) 7.5 mg tablet TAKE 1 TABLET BY MOUTH EVERY DAY 30 Tab 2    amLODIPine (NORVASC) 10 mg tablet TAKE 1 TAB BY MOUTH DAILY. FOR BLOOD PRESSURE 30 Tab 2    SPIRIVA WITH HANDIHALER 18 mcg inhalation capsule INHALE 1 CAPSULE DAILY 30 Cap 5    ergocalciferol (ERGOCALCIFEROL) 50,000 unit capsule TAKE ONE CAPSULE BY MOUTH ONE TIME PER WEEK 4 Cap 2    omeprazole (PRILOSEC) 40 mg capsule Take 1 Cap by mouth daily. 90 Cap 1    QUEtiapine (SEROQUEL) 300 mg tablet Take 1 Tab by mouth nightly. 30 Tab 1    MULTI-VITAMIN WITH IRON PO Take 1 Tab by mouth daily. Jb dennis For use as needed 1 Device prn    albuterol (PROVENTIL HFA, VENTOLIN HFA, PROAIR HFA) 90 mcg/actuation inhaler Take 1-2 Puffs by inhalation every four (4) hours as needed for Wheezing. 1 Inhaler 5    gabapentin (NEURONTIN) 300 mg capsule Take 1 Cap by mouth two (2) times a day.  60 Cap 5       Allergies   Allergen Reactions    Codeine Swelling     Tongue swells; Patient has tolerated Percocet and Morphine without adverse effects  Oxymorphone and oxycodone are home meds    Ambien [Zolpidem] Other (comments)     \"Sleep driving\"    Compazine [Prochlorperazine Edisylate] Swelling     Tongue swells    Lisinopril Swelling     Tongue swells      Peanut Nausea and Vomiting     PEANUTS IN SHELL - SEVERE N&V       ROS:   Review of Systems   Constitutional: Negative for malaise/fatigue. Eyes: Negative for blurred vision. Respiratory: Negative for shortness of breath. Cardiovascular: Negative for chest pain. Neurological: Positive for sensory change, focal weakness and weakness. Negative for dizziness, seizures, loss of consciousness and headaches. Psychiatric/Behavioral: Positive for memory loss. Objective:     Visit Vitals  /83   Pulse 86   Temp 98.1 °F (36.7 °C) (Oral)   Resp 16   Ht 5' 8\" (1.727 m)   Wt 225 lb (102.1 kg)   LMP 08/04/2013   SpO2 96%   BMI 34.21 kg/m²       Vitals and Nurse Documentation reviewed. Physical Exam   Constitutional: No distress. Cardiovascular: S1 normal and S2 normal. Exam reveals no gallop and no friction rub. No murmur heard. Pulmonary/Chest: Breath sounds normal. No respiratory distress. Neurological: She has intact cranial nerves. She displays weakness (generalized but worse on left) and abnormal speech. She displays facial symmetry. Gait abnormal. Coordination normal.   Gait compensatory with use of walker. Skin: Skin is warm and dry.    Psychiatric: Mood and affect normal.

## 2019-09-12 LAB
ALBUMIN SERPL-MCNC: 4.1 G/DL (ref 3.5–5.5)
ALBUMIN/GLOB SERPL: 1.4 {RATIO} (ref 1.2–2.2)
ALP SERPL-CCNC: 142 IU/L (ref 39–117)
ALT SERPL-CCNC: 17 IU/L (ref 0–32)
AST SERPL-CCNC: 17 IU/L (ref 0–40)
BILIRUB SERPL-MCNC: 0.3 MG/DL (ref 0–1.2)
BUN SERPL-MCNC: 10 MG/DL (ref 6–24)
BUN/CREAT SERPL: 10 (ref 9–23)
CALCIUM SERPL-MCNC: 9.5 MG/DL (ref 8.7–10.2)
CHLORIDE SERPL-SCNC: 99 MMOL/L (ref 96–106)
CHOLEST SERPL-MCNC: 209 MG/DL (ref 100–199)
CO2 SERPL-SCNC: 27 MMOL/L (ref 20–29)
CREAT SERPL-MCNC: 0.96 MG/DL (ref 0.57–1)
CREAT UR-MCNC: NORMAL MG/DL
EST. AVERAGE GLUCOSE BLD GHB EST-MCNC: 105 MG/DL
GLOBULIN SER CALC-MCNC: 3 G/DL (ref 1.5–4.5)
GLUCOSE SERPL-MCNC: 78 MG/DL (ref 65–99)
HBA1C MFR BLD: 5.3 % (ref 4.8–5.6)
HDLC SERPL-MCNC: 55 MG/DL
INTERPRETATION, 910389: NORMAL
LDLC SERPL CALC-MCNC: 124 MG/DL (ref 0–99)
MICROALBUMIN UR-MCNC: NORMAL
POTASSIUM SERPL-SCNC: 4 MMOL/L (ref 3.5–5.2)
PROT SERPL-MCNC: 7.1 G/DL (ref 6–8.5)
SODIUM SERPL-SCNC: 143 MMOL/L (ref 134–144)
TRIGL SERPL-MCNC: 151 MG/DL (ref 0–149)
TSH SERPL DL<=0.005 MIU/L-ACNC: 1.56 UIU/ML (ref 0.45–4.5)
VIT B12 SERPL-MCNC: 567 PG/ML (ref 232–1245)
VLDLC SERPL CALC-MCNC: 30 MG/DL (ref 5–40)

## 2019-09-13 RX ORDER — ATORVASTATIN CALCIUM 20 MG/1
20 TABLET, FILM COATED ORAL DAILY
Qty: 30 TAB | Refills: 3 | Status: SHIPPED | OUTPATIENT
Start: 2019-09-13 | End: 2020-02-21 | Stop reason: SDUPTHER

## 2019-09-17 ENCOUNTER — TELEPHONE (OUTPATIENT)
Dept: FAMILY MEDICINE CLINIC | Age: 49
End: 2019-09-17

## 2019-09-17 DIAGNOSIS — M54.50 CHRONIC BILATERAL LOW BACK PAIN WITHOUT SCIATICA: Primary | ICD-10-CM

## 2019-09-17 DIAGNOSIS — G89.29 CHRONIC BILATERAL LOW BACK PAIN WITHOUT SCIATICA: Primary | ICD-10-CM

## 2019-09-17 NOTE — TELEPHONE ENCOUNTER
Pt is calling to request a referral to the [facility]. Pt stated spine center needs last 3 office not that pertain to the pain. Phn # [phone] Fax# [phone]  and Physical Therapy.        Best Contact #[phone]  LOV 09/11/19

## 2019-10-02 RX ORDER — ERGOCALCIFEROL 1.25 MG/1
CAPSULE ORAL
Qty: 4 CAP | Refills: 2 | Status: SHIPPED | OUTPATIENT
Start: 2019-10-02 | End: 2020-06-02

## 2019-10-04 DIAGNOSIS — I10 ESSENTIAL HYPERTENSION: ICD-10-CM

## 2019-10-07 RX ORDER — TRIAMTERENE AND HYDROCHLOROTHIAZIDE 37.5; 25 MG/1; MG/1
CAPSULE ORAL
Qty: 30 CAP | Refills: 0 | Status: SHIPPED | OUTPATIENT
Start: 2019-10-07 | End: 2020-02-12

## 2019-10-17 ENCOUNTER — TELEPHONE (OUTPATIENT)
Dept: FAMILY MEDICINE CLINIC | Age: 49
End: 2019-10-17

## 2019-10-17 NOTE — TELEPHONE ENCOUNTER
----- Message from Laura Knapp sent at 10/17/2019  1:31 PM EDT -----  Regarding: SUDHAKAR Fabian/Telephone  Patient return call yes    Caller's first and last name and relationship (if not the patient): Ewelina Ip contact number(s): [phone]      Whose call is being returned:Patient is returning the nurse call she did not disclose the reason for the call. Details to clarify the request:      [facility] call. No answer. LVM with [facility] information to call them and they will better assist the pt with setting up the appointment for MRI.

## 2019-10-17 NOTE — TELEPHONE ENCOUNTER
----- Message from Horton Bam sent at 10/17/2019 11:21 AM EDT -----  Regarding: SUDHAKAR Fabian/ telephone   General Message/Vendor Calls    Caller's first and last name:      Reason for call:   Pt is requesting a call back in regards to MRI and scheduling     Callback required yes/no and why:    Yes   Best contact number(s):  [phone]    Details to clarify the request:      [facility] call. No answer. LVM with [facility] information to call them and they will better assist the pt with setting up the appointment for MRI.

## 2019-10-17 NOTE — TELEPHONE ENCOUNTER
----- Message from Lauro Lin sent at 10/17/2019 11:21 AM EDT -----  Regarding: SUDHAKAR Fabian/ telephone   General Message/Vendor Calls    Caller's first and last name:      Reason for call:   Pt is requesting a call back in regards to MRI and scheduling     Callback required yes/no and why:    Yes   Best contact number(s):  [phone]    Details to clarify the request:      Lauro Lin

## 2019-10-24 ENCOUNTER — HOSPITAL ENCOUNTER (OUTPATIENT)
Dept: MRI IMAGING | Age: 49
Discharge: HOME | End: 2019-10-24
Attending: NURSE PRACTITIONER
Payer: MEDICAID

## 2019-10-24 VITALS — BODY MASS INDEX: 33.45 KG/M2 | WEIGHT: 220 LBS

## 2019-10-24 DIAGNOSIS — R53.1 LEFT-SIDED WEAKNESS: ICD-10-CM

## 2019-10-24 DIAGNOSIS — R41.3 MEMORY LOSS: ICD-10-CM

## 2019-10-24 DIAGNOSIS — Z72.0 TOBACCO ABUSE: ICD-10-CM

## 2019-10-24 PROCEDURE — A9575 INJ GADOTERATE MEGLUMI 0.1ML: HCPCS | Performed by: NURSE PRACTITIONER

## 2019-10-24 PROCEDURE — 74011250636 HC RX REV CODE- 250/636: Performed by: NURSE PRACTITIONER

## 2019-10-24 PROCEDURE — 70553 MRI BRAIN STEM W/O & W/DYE: CPT

## 2019-10-24 RX ORDER — GADOTERATE MEGLUMINE 376.9 MG/ML
20 INJECTION INTRAVENOUS
OUTPATIENT
Start: 2019-10-24 | End: 2019-10-24

## 2019-10-24 RX ORDER — GADOTERATE MEGLUMINE 376.9 MG/ML
20 INJECTION INTRAVENOUS
Status: COMPLETED | OUTPATIENT
Start: 2019-10-24 | End: 2019-10-24

## 2019-10-24 RX ORDER — GADOTERATE MEGLUMINE 376.9 MG/ML
0.1 INJECTION INTRAVENOUS
Status: DISCONTINUED | OUTPATIENT
Start: 2019-10-24 | End: 2019-10-24

## 2019-10-24 RX ADMIN — GADOTERATE MEGLUMINE 20 ML: 376.9 INJECTION INTRAVENOUS at 16:00

## 2019-11-04 RX ORDER — LORATADINE 10 MG/1
TABLET ORAL
Qty: 30 TAB | Refills: 2 | Status: SHIPPED | OUTPATIENT
Start: 2019-11-04 | End: 2020-02-27 | Stop reason: SDUPTHER

## 2019-11-04 RX ORDER — MELOXICAM 7.5 MG/1
TABLET ORAL
Qty: 30 TAB | Refills: 2 | OUTPATIENT
Start: 2019-11-04

## 2019-11-05 ENCOUNTER — TELEPHONE (OUTPATIENT)
Dept: FAMILY MEDICINE CLINIC | Age: 49
End: 2019-11-05

## 2019-11-05 NOTE — TELEPHONE ENCOUNTER
----- Message from [facility] sent at 11/5/2019  9:59 AM EST -----  Regarding: SUDHAKAR Fabian/Telephone  Contact: [phone]  Caller's first and last name: Alexis Donald  Reason for call: Request of referral   Callback required yes/no and why: yes  Best contact number(s): [phone]  Details to clarify the request: Request of referral to Dr. Michelle Lennon. Already discussed with NP this morning about this topic via phone call.

## 2019-11-05 NOTE — TELEPHONE ENCOUNTER
----- Message from Arvin Ray sent at 11/5/2019  9:33 AM EST -----  Regarding: SUDHAKAR Fabian/Telephone  Contact: [phone]  Caller's first and last name: Kylie Wylie  Reason for call:  returning call from nurse  Callback required yes/no and why: yes  Best contact number(s): [phone]  Details to clarify the request: n/a

## 2019-11-05 NOTE — PROGRESS NOTES
Outbound call to patient. Patients date of birth verified. Patient made aware of lab results per YUMIKO Fabian and verbalized understanding.

## 2019-11-05 NOTE — TELEPHONE ENCOUNTER
Nurse called patient to let her know that [facility] has refilled her medication Claritin. Pharmacy still has 2 refills left on the Meloxicam, they will go ahead and refill for patient.

## 2019-12-02 RX ORDER — MELOXICAM 7.5 MG/1
TABLET ORAL
Qty: 30 TAB | Refills: 2 | Status: SHIPPED | OUTPATIENT
Start: 2019-12-02 | End: 2020-04-27 | Stop reason: SDUPTHER

## 2020-01-21 DIAGNOSIS — I10 ESSENTIAL HYPERTENSION: ICD-10-CM

## 2020-01-21 NOTE — LETTER
2/3/2020 6:16 PM 
 
Ms. Gagan [facility],Fourth [facility] [address] Dear MsYazmin Powers: 
 
We've missed you! Please call our office at [phone] and schedule a follow up appointment for your continued care. Sincerely, Balta Hennessy NP

## 2020-01-23 RX ORDER — AMLODIPINE BESYLATE 10 MG/1
TABLET ORAL
Qty: 30 TAB | Refills: 3 | Status: SHIPPED | OUTPATIENT
Start: 2020-01-23 | End: 2020-07-15 | Stop reason: SDUPTHER

## 2020-01-24 ENCOUNTER — DOCUMENTATION ONLY (OUTPATIENT)
Dept: FAMILY MEDICINE CLINIC | Age: 50
End: 2020-01-24

## 2020-02-18 ENCOUNTER — OFFICE VISIT (OUTPATIENT)
Dept: FAMILY MEDICINE CLINIC | Age: 50
End: 2020-02-18

## 2020-02-18 VITALS
HEIGHT: 68 IN | SYSTOLIC BLOOD PRESSURE: 121 MMHG | RESPIRATION RATE: 18 BRPM | WEIGHT: 234.6 LBS | TEMPERATURE: 98.2 F | DIASTOLIC BLOOD PRESSURE: 75 MMHG | HEART RATE: 98 BPM | BODY MASS INDEX: 35.55 KG/M2 | OXYGEN SATURATION: 99 %

## 2020-02-18 DIAGNOSIS — Z12.11 ENCOUNTER FOR SCREENING COLONOSCOPY: ICD-10-CM

## 2020-02-18 DIAGNOSIS — J41.0 SIMPLE CHRONIC BRONCHITIS (HCC): ICD-10-CM

## 2020-02-18 DIAGNOSIS — Z12.31 SCREENING MAMMOGRAM, ENCOUNTER FOR: ICD-10-CM

## 2020-02-18 DIAGNOSIS — E78.2 MIXED HYPERLIPIDEMIA: Primary | ICD-10-CM

## 2020-02-18 RX ORDER — IBUPROFEN 800 MG/1
TABLET ORAL
COMMUNITY
End: 2020-04-27

## 2020-02-18 NOTE — PROGRESS NOTES
Chief Complaint   Patient presents with    Follow Up Chronic Condition     follow up MRI     Form Completion     1. Have you been to the ER, urgent care clinic since your last visit? Hospitalized since your last visit? No    2. Have you seen or consulted any other health care providers outside of the [address] [place] [address] since your last visit? Include any pap smears or colon screening.  No

## 2020-02-18 NOTE — PROGRESS NOTES
Assessment/Plan:     Diagnoses and all orders for this visit:    1. Mixed hyperlipidemia  -     LIPID PANEL  -     HEPATIC FUNCTION PANEL  Recheck labs. Tolerating Atorvastatin. 2. Encounter for screening colonoscopy  She will return to Dr. Rik Barnes. 3. Screening mammogram, encounter for  -     [facility] MAMMO BI SCREENING INCL CAD; Future    4. Simple chronic bronchitis (HCC)  -     umeclidinium-vilanterol (ANORO ELLIPTA) 62.5-25 mcg/actuation inhaler; Take 1 Puff by inhalation daily. Discontinue Spiriva. She will start treatment as above. Follow-up and Dispositions    · Return in about 6 months (around 8/18/2020) for Follow Up. Discussed expected course/resolution/complications of diagnosis in detail with patient. Medication risks/benefits/costs/interactions/alternatives discussed with patient. Pt was given after visit summary which includes diagnoses, current medications & vitals. Pt expressed understanding with the diagnosis and plan          Subjective:      Pastor Lu is a 47 y.o. female who presents for had concerns including Follow Up Chronic Condition (follow up MRI ) and Form Completion. She is interested in going back to school. She requires form completion today. She is not taking Spiriva at this time due to intolerance of the dry inhaler. She is interested in alternatives. Continues to use tobacco regularly. Cardiovascular Review:  The patient has hypertension, hyperlipidemia, obesity and tobacco abuse. Diet and Lifestyle: not attempting to follow a low fat, low cholesterol diet  Home BP Monitoring: is not measured at home. Pertinent ROS: taking medications as instructed, no medication side effects noted, no TIA's, no chest pain on exertion, no dyspnea on exertion, no swelling of ankles.      Patient Active Problem List   Diagnosis Code    Morbid obesity ([facility] E66.01    Hypertension I10    Depression F32.9    GERD (gastroesophageal reflux disease) K21.9    Arthritis M19.90    Chronic low back pain M54.5, G89.29    Bilateral chronic knee pain M25.561, M25.562, G89.29    Simple chronic bronchitis ([facility]) J41.0    Tobacco use Z72.0    Other hyperlipidemia E78.49    Bipolar disorder, mixed ([facility]) F31.60       Current Outpatient Medications   Medication Sig Dispense Refill    ibuprofen (MOTRIN) 800 mg tablet Take  by mouth.  umeclidinium-vilanterol (ANORO ELLIPTA) 62.5-25 mcg/actuation inhaler Take 1 Puff by inhalation daily. 1 Inhaler 3    triamterene-hydroCHLOROthiazide (DYAZIDE) 37.5-25 mg per capsule Take 1 Cap by mouth daily. 90 Cap 3    amLODIPine (NORVASC) 10 mg tablet TAKE 1 TABLET BY MOUTH EVERY DAY FOR BLOOD PRESSURE 30 Tab 3    meloxicam (MOBIC) 7.5 mg tablet TAKE 1 TABLET BY MOUTH EVERY DAY 30 Tab 2    loratadine (CLARITIN) 10 mg tablet TAKE 1 TABLET BY MOUTH EVERY DAY 30 Tab 2    ergocalciferol (ERGOCALCIFEROL) 50,000 unit capsule TAKE ONE CAPSULE BY MOUTH ONE TIME PER WEEK 4 Cap 2    atorvastatin (LIPITOR) 20 mg tablet Take 1 Tab by mouth daily. 30 Tab 3    divalproex DR (DEPAKOTE) 250 mg tablet       omeprazole (PRILOSEC) 40 mg capsule Take 1 Cap by mouth daily. 90 Cap 1    QUEtiapine (SEROQUEL) 300 mg tablet Take 1 Tab by mouth nightly. 30 Tab 1    MULTI-VITAMIN WITH IRON PO Take 1 Tab by mouth daily. Dary dennis For use as needed 1 Device prn    albuterol (PROVENTIL HFA, VENTOLIN HFA, PROAIR HFA) 90 mcg/actuation inhaler Take 1-2 Puffs by inhalation every four (4) hours as needed for Wheezing. 1 Inhaler 5    gabapentin (NEURONTIN) 300 mg capsule Take 1 Cap by mouth two (2) times a day. 60 Cap 5       Allergies   Allergen Reactions    Codeine Swelling     Tongue swells;  Patient has tolerated Percocet and Morphine without adverse effects  Oxymorphone and oxycodone are home meds    Ambien [Zolpidem] Other (comments)     \"Sleep driving\"    Compazine [Prochlorperazine Edisylate] Swelling     Tongue swells    Lisinopril Swelling     Tongue swells      Peanut Nausea and Vomiting     PEANUTS IN SHELL - SEVERE N&V       ROS:   Review of Systems   Constitutional: Negative for malaise/fatigue. Eyes: Negative for blurred vision. Respiratory: Negative for shortness of breath. Cardiovascular: Negative for chest pain. Objective:     Visit Vitals  /75   Pulse 98   Temp 98.2 °F (36.8 °C) (Oral)   Resp 18   Ht 5' 8\" (1.727 m)   Wt 234 lb 9.6 oz (106.4 kg)   LMP 08/04/2013   SpO2 99%   BMI 35.67 kg/m²       Vitals and Nurse Documentation reviewed. Physical Exam  Constitutional:       General: She is not in acute distress. Cardiovascular:      Heart sounds: S1 normal and S2 normal. No murmur. No friction rub. No gallop. Pulmonary:      Effort: No respiratory distress. Breath sounds: Decreased breath sounds present. Skin:     General: Skin is warm and dry.    Psychiatric:         Mood and Affect: Mood and affect normal.

## 2020-02-26 ENCOUNTER — TELEPHONE (OUTPATIENT)
Dept: FAMILY MEDICINE CLINIC | Age: 50
End: 2020-02-26

## 2020-02-26 NOTE — TELEPHONE ENCOUNTER
----- Message from Tatum Harris sent at 2/26/2020 12:38 PM EST -----  Regarding: SUDHAKAR Fabian/ Telephone  Contact: [phone]  Caller's first and last name: n/a   Reason for call: Pt stated that the letter that was made needs to be on a letter head. Pt stated please make sure that the letter is completed but with a letter head.  Please verify with pt ASAP   Callback required yes/no and why:  yes  Best contact number(s):  [phone]  Details to clarify the request:n/a

## 2020-02-27 NOTE — TELEPHONE ENCOUNTER
Outbound call to patient at [phone] verified  patient states she will drop off a copy of the letter she needs completed tomorrow.

## 2020-02-29 LAB
ALBUMIN SERPL-MCNC: 4.2 G/DL (ref 3.8–4.8)
ALP SERPL-CCNC: 139 IU/L (ref 39–117)
ALT SERPL-CCNC: 19 IU/L (ref 0–32)
AST SERPL-CCNC: 15 IU/L (ref 0–40)
BILIRUB DIRECT SERPL-MCNC: 0.1 MG/DL (ref 0–0.4)
BILIRUB SERPL-MCNC: 0.3 MG/DL (ref 0–1.2)
CHOLEST SERPL-MCNC: 222 MG/DL (ref 100–199)
HDLC SERPL-MCNC: 47 MG/DL
INTERPRETATION, 910389: NORMAL
LDLC SERPL CALC-MCNC: 138 MG/DL (ref 0–99)
PROT SERPL-MCNC: 6.8 G/DL (ref 6–8.5)
TRIGL SERPL-MCNC: 184 MG/DL (ref 0–149)
VLDLC SERPL CALC-MCNC: 37 MG/DL (ref 5–40)

## 2020-03-01 RX ORDER — ASPIRIN 81 MG/1
81 TABLET ORAL DAILY
Qty: 30 TAB | Refills: 0
Start: 2020-03-01 | End: 2020-04-27

## 2020-03-02 NOTE — TELEPHONE ENCOUNTER
----- Message from Butch Mike sent at 3/2/2020 12:10 PM EST -----  Regarding: Caren/ Telephone  Contact: [phone]  Caller's first and last name: n/a   Reason for call: Pt is waiting on a letter   Callback required yes/no and why: yes  Best contact number(s): [phone]  Details to clarify the request: Please call pt to let her know if letter has arrived.

## 2020-03-03 NOTE — TELEPHONE ENCOUNTER
Outbound call to patient. Left message that letter has been placed up front for  and if any questions can give the office a call at [phone].

## 2020-03-23 RX ORDER — ALBUTEROL SULFATE 90 UG/1
1-2 AEROSOL, METERED RESPIRATORY (INHALATION)
Qty: 1 INHALER | Refills: 5 | Status: SHIPPED | OUTPATIENT
Start: 2020-03-23

## 2020-04-24 ENCOUNTER — VIRTUAL VISIT (OUTPATIENT)
Dept: FAMILY MEDICINE CLINIC | Age: 50
End: 2020-04-24

## 2020-04-24 NOTE — PROGRESS NOTES
Called pt to start visit and pt states that she is driving and would like to just reschedule to Monday.  appt changed

## 2020-04-27 ENCOUNTER — VIRTUAL VISIT (OUTPATIENT)
Dept: FAMILY MEDICINE CLINIC | Age: 50
End: 2020-04-27

## 2020-04-27 DIAGNOSIS — R06.09 DYSPNEA ON EXERTION: ICD-10-CM

## 2020-04-27 DIAGNOSIS — J44.9 CHRONIC OBSTRUCTIVE PULMONARY DISEASE, UNSPECIFIED COPD TYPE (HCC): ICD-10-CM

## 2020-04-27 DIAGNOSIS — G89.4 CHRONIC PAIN SYNDROME: Primary | ICD-10-CM

## 2020-04-27 DIAGNOSIS — I10 ESSENTIAL HYPERTENSION: ICD-10-CM

## 2020-04-27 RX ORDER — MELOXICAM 7.5 MG/1
TABLET ORAL
Qty: 30 TAB | Refills: 2 | Status: SHIPPED | OUTPATIENT
Start: 2020-04-27 | End: 2020-08-13 | Stop reason: SDUPTHER

## 2020-04-27 NOTE — PROGRESS NOTES
Marques Bañuelos Elke  47 y.o. female  1970  [address] [facility] [address]  [id number]     [facility]       Encounter Date: 4/27/2020           Established Patient Visit Note: Adarsh Alex MD    Reason for Appointment:  Chief Complaint   Patient presents with    Arm Pain     left     Leg Pain       History of Present Illness:  History provided by patient    Dyan Riggs is a 47 y.o. female who presents today for:      Chronic Arm and Leg Pain  - patient reports that her arm numbness is improving  - takes PRN meloxicam   - she last saw orthopedics a few months ago with recommendation for physical therapy; she reports that she has not been to PT in a while because it did not help very much  - she was previously referred to Dr. Che Robertson, but she reports that she did not go; she would like a referral to another pain management provider  - she reports that she has an appointment at the [place] as well    Tobacco Use and COPD  Patient endorses having chronic dyspnea on exertion  smoking 5-6 cigarettes per day from 1-1.5 PPD  Current Medicines: PRN Albuterol  Previous Medicines: Spiriva,  Anoro because she did not like it (she did not like the powder)  Treatment: - uses oxygen machine for sleeping   Specialist: she is followed by pulmonology who told her that she had COPD (last visit 6 months ago), but she would like a referral to a pulmonologist in this area. She also reports that she would also like a referral to cardiology to evaluate her heart    HTN  Taking Norvasc and Dyazide,   Not check home BPs    Review of Systems  Review of Systems   Constitutional: Negative for chills and fever. Respiratory: Positive for shortness of breath (chronic) and wheezing (chronic). Negative for cough. Cardiovascular: Negative for chest pain and palpitations. Allergies: Codeine; Ambien [zolpidem]; Compazine [prochlorperazine edisylate];  Lisinopril; and Peanut    Medications: (Updated to reflect final medication list after visit)    Current Outpatient Medications:     meloxicam (MOBIC) 7.5 mg tablet, TAKE 1 TABLET BY MOUTH DAILY AS NEEDED FOR PAIN, Disp: 30 Tab, Rfl: 2    albuterol (PROVENTIL HFA, VENTOLIN HFA, PROAIR HFA) 90 mcg/actuation inhaler, Take 1-2 Puffs by inhalation every four (4) hours as needed for Wheezing., Disp: 1 Inhaler, Rfl: 5    loratadine (CLARITIN) 10 mg tablet, Take 1 Tab by mouth daily. , Disp: 30 Tab, Rfl: 5    atorvastatin (LIPITOR) 20 mg tablet, Take 1 Tab by mouth daily. , Disp: 90 Tab, Rfl: 3    triamterene-hydroCHLOROthiazide (DYAZIDE) 37.5-25 mg per capsule, Take 1 Cap by mouth daily. , Disp: 90 Cap, Rfl: 3    amLODIPine (NORVASC) 10 mg tablet, TAKE 1 TABLET BY MOUTH EVERY DAY FOR BLOOD PRESSURE, Disp: 30 Tab, Rfl: 3    ergocalciferol (ERGOCALCIFEROL) 50,000 unit capsule, TAKE ONE CAPSULE BY MOUTH ONE TIME PER WEEK, Disp: 4 Cap, Rfl: 2    divalproex DR (DEPAKOTE) 250 mg tablet, daily. , Disp: , Rfl:     omeprazole (PRILOSEC) 40 mg capsule, Take 1 Cap by mouth daily. , Disp: 90 Cap, Rfl: 1    QUEtiapine (SEROQUEL) 300 mg tablet, Take 1 Tab by mouth nightly. (Patient taking differently: Take 400 mg by mouth nightly. 2 tab), Disp: 30 Tab, Rfl: 1    MULTI-VITAMIN WITH IRON PO, Take 1 Tab by mouth daily. , Disp: , Rfl:     Cane jeannie, For use as needed, Disp: 1 Device, Rfl: prn    History  Patient Care Team:  Naz Fabian NP as PCP - General (Nurse Practitioner)  Naz Fabian NP as PCP - REHABILITATION [facility]  Dianne Garcia NP as Nurse Practitioner (Psychiatry)    Past Medical History: she has a past medical history of Bilateral chronic knee pain (10/25/2012), Bipolar 1 disorder ([facility] [address] Chronic bronchitis with COPD (chronic obstructive pulmonary disease) ([facility] [address] Chronic low back pain (10/25/2012), Chronic pain, Constipation, Depression, Dyspepsia and other specified disorders of function of stomach, GERD (gastroesophageal reflux disease), Hepatitis A (1991), Hyperlipidemia, Hypertension, Morbid obesity ([facility] (10/25/2012), Unspecified sleep apnea, and [facility] ([facility] ([facility]). She also has no past medical history of Adverse effect of anesthesia. Past Surgical History: she has a past surgical history that includes hx cholecystectomy; hx tubal ligation; hx hysterectomy (11/13/15); hx gyn; hx gastrectomy (8/18/14); colonoscopy (N/A, 11/8/2016); and colonoscopy (Left, 6/19/2018). Family Medical History: family history includes Alzheimer in her father; Diabetes in her brother; Heart Disease in her brother and mother; Hypertension in her brother and mother; Other in her brother; Psychiatric Disorder in her mother. Social History: she reports that she has been smoking cigarettes. She has a 5.00 pack-year smoking history. She has never used smokeless tobacco. She reports previous alcohol use. She reports that she does not use drugs. Objective:   Vital Signs  Visit Vitals  [facility] 08/04/2013     Unable to obtain vital signs today as patient does not have equipment for this at home    Physical Exam  Constitutional:       Appearance: Normal appearance. She is well-groomed and normal weight. Eyes:      General: Lids are normal. Vision grossly intact. Gaze aligned appropriately. Conjunctiva/sclera:      Right eye: Right conjunctiva is not injected. Left eye: Left conjunctiva is not injected. Neck:      Musculoskeletal: Full passive range of motion without pain and normal range of motion. Pulmonary:      Effort: Pulmonary effort is normal. No tachypnea, bradypnea, accessory muscle usage or respiratory distress. Skin:     Coloration: Skin is not ashen, cyanotic, jaundiced or mottled. Neurological:      General: No focal deficit present. Mental Status: She is alert. Mental status is at baseline.    Psychiatric:         Attention and Perception: Attention and perception normal.         Mood and Affect: Mood and affect normal.         Speech: Speech normal.         Behavior: Behavior normal. Behavior is cooperative. Assessment & Plan:      ICD-10-CM ICD-9-CM    1. Chronic pain syndrome G89.4 338.4 REFERRAL TO PAIN MANAGEMENT      meloxicam (MOBIC) 7.5 mg tablet   2. Chronic obstructive pulmonary disease, unspecified COPD type ([facility] J44.9 496 REFERRAL TO PULMONARY DISEASE   3. Dyspnea on exertion R06.09 786.09 REFERRAL TO CARDIOLOGY   4. Essential hypertension I10 401.9      Chronic Pain: Chronic with unclear control; will continue mobic and provide referral to pain management for further evaluation  COPD: Chronic, uncontrolled. Patient did not tolerate controller medicines in the past. Will provide referral to pulmonology for further evaluation  Dyspnea on Exertion: Chronic, likely 2/2 to COPD but patient would like referral to cardiology to ensure that it is not related to her heart. Will provide referral as requested  HTN: Chronic, unclear control. continue current regimen for now; discussed recommendation to start checking her BP at home    I was in the office while conducting this encounter. Consent:  She and/or her healthcare decision maker is aware that this patient-initiated Telehealth encounter is a billable service, with coverage as determined by her insurance carrier. She is aware that she may receive a bill and has provided verbal consent to proceed: Yes    This virtual visit was conducted via [employer]. Pursuant to the emergency declaration under the [facility] waiver authority and the [employer] and [employer] General Act, this Virtual  Visit was conducted to reduce the patient's risk of exposure to COVID-19 and provide continuity of care for an established patient. Services were provided through a video synchronous discussion virtually to substitute for in-person clinic visit.   Due to this being a TeleHealth evaluation, many elements of the physical examination are unable to be assessed. Total Time: minutes: 11-20 minutes. I have discussed the diagnosis with the patient and the intended plan as seen in the above orders. The patient has received an after-visit summary along with patient information handout. I have discussed medication side effects and warnings with the patient as well. Disposition  Follow-up and Dispositions    · Return in about 3 months (around 7/27/2020).            Beverley Morales MD

## 2020-04-27 NOTE — PROGRESS NOTES
Chief Complaint   Patient presents with    Arm Pain     left     Leg Pain     1. Have you been to the ER, urgent care clinic since your last visit? Hospitalized since your last visit? No    2. Have you seen or consulted any other health care providers outside of the [address] [place] [address] since your last visit? Include any pap smears or colon screening.  No

## 2020-04-29 ENCOUNTER — DOCUMENTATION ONLY (OUTPATIENT)
Dept: FAMILY MEDICINE CLINIC | Age: 50
End: 2020-04-29

## 2020-04-29 NOTE — PROGRESS NOTES
Received fax from [address] [place] [address] regarding incontinence materials. Form completed by provider and faxed to [phone] with received confirmation .

## 2020-05-08 ENCOUNTER — DOCUMENTATION ONLY (OUTPATIENT)
Dept: FAMILY MEDICINE CLINIC | Age: 50
End: 2020-05-08

## 2020-05-08 NOTE — PROGRESS NOTES
Incontinence supply forms received from home care delivered. Completed and signed by provider. Faxed to [phone].  Received confirmation

## 2020-06-02 ENCOUNTER — VIRTUAL VISIT (OUTPATIENT)
Dept: FAMILY MEDICINE CLINIC | Age: 50
End: 2020-06-02

## 2020-06-02 DIAGNOSIS — G89.29 CHRONIC NECK PAIN: Primary | ICD-10-CM

## 2020-06-02 DIAGNOSIS — M54.2 CHRONIC NECK PAIN: Primary | ICD-10-CM

## 2020-06-02 DIAGNOSIS — R29.818 NEUROLOGIC ABNORMALITY: ICD-10-CM

## 2020-06-02 DIAGNOSIS — J44.9 CHRONIC OBSTRUCTIVE PULMONARY DISEASE, UNSPECIFIED COPD TYPE (HCC): ICD-10-CM

## 2020-06-02 RX ORDER — DEXLANSOPRAZOLE 60 MG/1
CAPSULE, DELAYED RELEASE ORAL
COMMUNITY
Start: 2020-05-08

## 2020-06-02 RX ORDER — TIOTROPIUM BROMIDE AND OLODATEROL 3.124; 2.736 UG/1; UG/1
SPRAY, METERED RESPIRATORY (INHALATION)
COMMUNITY
Start: 2020-05-06

## 2020-06-02 NOTE — PROGRESS NOTES
Will Bedoya  47 y.o. female  1970  [address] [place] [address]  [id number]     [address] [facility]       Encounter Date: 6/2/2020           Established Patient Visit Note: Karen Valladares MD    Reason for Appointment:  Chief Complaint   Patient presents with    Follow-up       History of Present Illness:  History provided by patient    Cammie Miranda is a 47 y.o. female who presents today for follow up of her chronic pain. Chronic Neck Pain  She has been having leg and neck pain for several years that she reports is getting worse. She had cervical imaging in Sept, 2018 showing mild spondylosis of C5-6. She was previously referred to pain management, but she states that she was not able to get an appointment. She is getting physical therapy weekly. Patient reports that her PT told her that she should been seen by neurology to evaluate for MS. She is unable to elaborate on why. COPD  Medications: PRN Albuterol, Stiolto  Pulmonology: Dr. Elo Curtis, she has follow up scheduled tomorrow  She reports that her breathing has been stable  She has a follow up appointment with her pulmonologist tomorrow. Review of Systems  Review of Systems   Constitutional: Negative for chills and fever. Respiratory: Negative for cough, shortness of breath and wheezing. Cardiovascular: Negative for chest pain and palpitations. Allergies: Codeine; Ambien [zolpidem]; Compazine [prochlorperazine edisylate];  Lisinopril; and Peanut    Medications: (Updated to reflect final medication list after visit)    Current Outpatient Medications:     Dexilant 60 mg CpDB capsule (delayed release), , Disp: , Rfl:     Stiolto Respimat 2.5-2.5 mcg/actuation inhaler, , Disp: , Rfl:     meloxicam (MOBIC) 7.5 mg tablet, TAKE 1 TABLET BY MOUTH DAILY AS NEEDED FOR PAIN, Disp: 30 Tab, Rfl: 2    albuterol (PROVENTIL HFA, VENTOLIN HFA, PROAIR HFA) 90 mcg/actuation inhaler, Take 1-2 Puffs by inhalation every four (4) hours as needed for Wheezing., Disp: 1 Inhaler, Rfl: 5    loratadine (CLARITIN) 10 mg tablet, Take 1 Tab by mouth daily. , Disp: 30 Tab, Rfl: 5    atorvastatin (LIPITOR) 20 mg tablet, Take 1 Tab by mouth daily. , Disp: 90 Tab, Rfl: 3    triamterene-hydroCHLOROthiazide (DYAZIDE) 37.5-25 mg per capsule, Take 1 Cap by mouth daily. , Disp: 90 Cap, Rfl: 3    amLODIPine (NORVASC) 10 mg tablet, TAKE 1 TABLET BY MOUTH EVERY DAY FOR BLOOD PRESSURE, Disp: 30 Tab, Rfl: 3    divalproex DR (DEPAKOTE) 250 mg tablet, daily. , Disp: , Rfl:     QUEtiapine (SEROQUEL) 300 mg tablet, Take 1 Tab by mouth nightly. (Patient taking differently: Take 400 mg by mouth nightly. 2 tab), Disp: 30 Tab, Rfl: 1    MULTI-VITAMIN WITH IRON PO, Take 1 Tab by mouth daily. , Disp: , Rfl:     Cane jeannie, For use as needed, Disp: 1 Device, Rfl: prn    History  Patient Care Team:  Cassandra Fabian NP as PCP - General (Nurse Practitioner)  Cassandra Fabian NP as PCP [facility]  Juan Daniel Wright NP as Nurse Practitioner (Psychiatry)  Adrien Ledsema MD as Physician (Pulmonary Disease)    Past Medical History: she has a past medical history of Bilateral chronic knee pain (10/25/2012), Bipolar 1 disorder ([facility] [address] Chronic bronchitis with COPD (chronic obstructive pulmonary disease) ([address] Chronic low back pain (10/25/2012), Chronic pain, Constipation, Depression, Dyspepsia and other specified disorders of function of stomach, GERD (gastroesophageal reflux disease), Hepatitis A (1991), Hyperlipidemia, Hypertension, Morbid obesity ([address] (10/25/2012), Unspecified sleep apnea, and Uterine cancer ([facility] (2014). She also has no past medical history of Adverse effect of anesthesia. Past Surgical History: she has a past surgical history that includes hx cholecystectomy; hx tubal ligation; hx hysterectomy (11/13/15); hx gyn; hx gastrectomy (8/18/14); colonoscopy (N/A, 11/8/2016); and colonoscopy (Left, 6/19/2018).     Family Medical History: family history includes Alzheimer in her father; Diabetes in her brother; Heart Disease in her brother and mother; Hypertension in her brother and mother; Other in her brother; Psychiatric Disorder in her mother. Social History: she reports that she has been smoking cigarettes. She has a 5.00 pack-year smoking history. She has never used smokeless tobacco. She reports previous alcohol use. She reports that she does not use drugs. Objective:   Vital Signs  Visit Vitals  [facility] 08/04/2013     Unable to obtain full set of vital signs today as patient does not have all equipment for this at home    Physical Exam  Constitutional:       Appearance: Normal appearance. She is well-groomed and normal weight. Eyes:      General: Lids are normal. Vision grossly intact. Gaze aligned appropriately. Conjunctiva/sclera:      Right eye: Right conjunctiva is not injected. Left eye: Left conjunctiva is not injected. Neck:      Musculoskeletal: Full passive range of motion without pain and normal range of motion. Pulmonary:      Effort: Pulmonary effort is normal. No tachypnea, bradypnea, accessory muscle usage or respiratory distress. Skin:     Coloration: Skin is not ashen, cyanotic, jaundiced or mottled. Neurological:      General: No focal deficit present. Mental Status: She is alert. Mental status is at baseline. Psychiatric:         Attention and Perception: Attention and perception normal.         Mood and Affect: Mood and affect normal.         Speech: Speech normal.         Behavior: Behavior normal. Behavior is cooperative. Assessment & Plan:      ICD-10-CM ICD-9-CM    1. Chronic neck pain M54.2 723.1 REFERRAL TO ORTHOPEDIC SURGERY    G89.29 338.29    2. Neurologic abnormality R29.818 781.99 REFERRAL TO NEUROLOGY   3. Chronic obstructive pulmonary disease, unspecified COPD type ([facility] J44.9 496      - Chronic Neck Pain: Chronic, uncontrolled.  Last Xray in 2018 showed cervical spondylosis of C5-6. Will provide referral to orthopedics for further evaluation. Continue Physical therapy. - Neurologic Abnormality: patient reports that her physical therapist is concerned about patient having multiple sclerosis, but patient is unable to elaborate on reasons why. Will provide referral to neurology for further evaluation.   - COPD: chronic, stable; continue current regimen and follow up with pulmonology as scheduled    I was in the office while conducting this encounter. Consent:  She and/or her healthcare decision maker is aware that this patient-initiated Telehealth encounter is a billable service, with coverage as determined by her insurance carrier. She is aware that she may receive a bill and has provided verbal consent to proceed: Yes    This virtual visit was conducted via [employer]. Pursuant to the emergency declaration under the [facility] waiver authority and the [employer] and [employer] Act, this Virtual  Visit was conducted to reduce the patient's risk of exposure to COVID-19 and provide continuity of care for an established patient. Services were provided through a video synchronous discussion virtually to substitute for in-person clinic visit. Due to this being a TeleHealth evaluation, many elements of the physical examination are unable to be assessed. Total Time: minutes: 11-20 minutes. I have discussed the diagnosis with the patient and the intended plan as seen in the above orders. The patient has received an after-visit summary along with patient information handout. I have discussed medication side effects and warnings with the patient as well. Disposition  Follow-up and Dispositions    · Return in about 3 months (around 9/2/2020), or if symptoms worsen or fail to improve.            Krzysztof Springer MD

## 2020-06-09 ENCOUNTER — TELEPHONE (OUTPATIENT)
Dept: FAMILY MEDICINE CLINIC | Age: 50
End: 2020-06-09

## 2020-06-09 DIAGNOSIS — M54.2 CHRONIC NECK PAIN: Primary | ICD-10-CM

## 2020-06-09 DIAGNOSIS — G89.29 CHRONIC NECK PAIN: Primary | ICD-10-CM

## 2020-06-09 RX ORDER — ACETAMINOPHEN 500 MG
500 TABLET ORAL
Qty: 30 TAB | Refills: 1 | Status: SHIPPED | OUTPATIENT
Start: 2020-06-09

## 2020-06-09 NOTE — TELEPHONE ENCOUNTER
Outbound call to patient. Patient request medication for pain. Advised patient that PCP is currently out of the office. Last saw Dr. Darius Villafana on virtual visit. Will route for recommendations.

## 2020-06-09 NOTE — TELEPHONE ENCOUNTER
----- Message from [employer] sent at 6/9/2020  9:37 AM EDT -----  Regarding: Partha/telephone  Pt is requesting a new Rx for pain. Pts number is [phone] and [facility] number is [phone].

## 2020-06-09 NOTE — TELEPHONE ENCOUNTER
Please call patient to let her know that a medicine has been sent to her pharmacy.     Alberto Alex MD

## 2020-07-15 DIAGNOSIS — I10 ESSENTIAL HYPERTENSION: ICD-10-CM

## 2020-07-15 RX ORDER — AMLODIPINE BESYLATE 10 MG/1
10 TABLET ORAL DAILY
Qty: 90 TAB | Refills: 0 | Status: SHIPPED | OUTPATIENT
Start: 2020-07-15 | End: 2020-09-28

## 2020-07-15 NOTE — TELEPHONE ENCOUNTER
Patient is requesting something else other than Tylenol 500 mg for pain. Patient states Tylenol gives her chest pain. BCN: [id number]. Please review. Thank you. Last visit 06/02/2020 Virtual visit MD Singh Hidden   Next appointment 08/18/2020 SUDHAKAR Fabian   Previous refill encounter(s) 01/23/2020 Norvasc #30 with 3 refill     Requested Prescriptions     Pending Prescriptions Disp Refills    amLODIPine (NORVASC) 10 mg tablet 90 Tab 1     Sig: Take 1 Tab by mouth daily.

## 2020-07-15 NOTE — TELEPHONE ENCOUNTER
LM for return call about the tylenol and to confirm taking mobic    LOV 6/2/20, due f/u in Sept, pt is scheduled with [facility] on 8/18/20.     Nothing on

## 2020-08-10 ENCOUNTER — TELEPHONE (OUTPATIENT)
Dept: FAMILY MEDICINE CLINIC | Age: 50
End: 2020-08-10

## 2020-08-10 NOTE — TELEPHONE ENCOUNTER
Pt called to check on her paper work for her wheelchair. She said the company has tried to contact us twice but nobody has responded.     BCB: [phone]

## 2020-08-11 NOTE — TELEPHONE ENCOUNTER
----- Message from Omayra Ross sent at 8/10/2020  1:01 PM EDT -----  Regarding: SUDHAKAR Fabian/ telephone  Patient's first and last name: Charlene Gottlieb  : 1970  ID numbers: [id number]    Caller's first and last name: pt  Reason for call: Requested paperwork  Callback required yes/no and why: yes   Best contact number(s): [phone]  Details to clarify the request: Pt missed call from practice in reference to paperwork request by specialist. Pt was to be filled out and sent back. Pt would like a call back to confirm paperwork was received.

## 2020-08-13 DIAGNOSIS — G89.4 CHRONIC PAIN SYNDROME: ICD-10-CM

## 2020-08-13 RX ORDER — MELOXICAM 7.5 MG/1
TABLET ORAL
Qty: 30 TAB | Refills: 0 | Status: SHIPPED | OUTPATIENT
Start: 2020-08-13 | End: 2020-12-19

## 2020-08-13 NOTE — TELEPHONE ENCOUNTER
[phone]- left message for pt regarding paperwork, I let her know that they were completed 1-2 weeks ago.  Advised pt to call me back if she had any further questions at [phone]

## 2020-08-13 NOTE — TELEPHONE ENCOUNTER
Last Visit: VV 6/2/20  MD Damir Reed  Next Appointment: 8/18/20  SUDHAKAR Fabian  Previous Refill Encounter(s): 4/27/20  30 + 2    Requested Prescriptions     Pending Prescriptions Disp Refills    meloxicam (MOBIC) 7.5 mg tablet 30 Tab 2     Sig: TAKE 1 TABLET BY MOUTH DAILY AS NEEDED FOR PAIN

## 2020-08-25 ENCOUNTER — TELEPHONE (OUTPATIENT)
Dept: FAMILY MEDICINE CLINIC | Age: 50
End: 2020-08-25

## 2020-08-25 NOTE — TELEPHONE ENCOUNTER
Yoselyn Zavala from [facility] called to follow up on the paper work for the wheelchair and bath chair for the pt. There were previous encounters on this on 08/10/2020 but I could not attach this to that day. They are saying the paper work was not complete and they re faxed it to us on 08/14/2020. The Letter of Medical Necessity and Rx for both the wheelchair and bath chair were both missing signatures. Please call her back to let her know if you have received it and is being worked on.     BCB: [phone]

## 2020-08-26 NOTE — TELEPHONE ENCOUNTER
Outbound call to Abdiel. Papillion states one of the forms were not signed for the patient. Papillion will refax form for signature. Understanding verbalized.

## 2020-09-18 ENCOUNTER — HOSPITAL ENCOUNTER (OUTPATIENT)
Age: 50
Setting detail: OUTPATIENT SURGERY
Discharge: HOME | End: 2020-09-18
Attending: INTERNAL MEDICINE | Admitting: INTERNAL MEDICINE
Payer: MEDICAID

## 2020-09-18 ENCOUNTER — ANESTHESIA (OUTPATIENT)
Dept: ENDOSCOPY | Age: 50
End: 2020-09-18
Payer: MEDICAID

## 2020-09-18 ENCOUNTER — ANESTHESIA EVENT (OUTPATIENT)
Dept: ENDOSCOPY | Age: 50
End: 2020-09-18
Payer: MEDICAID

## 2020-09-18 VITALS
HEART RATE: 77 BPM | TEMPERATURE: 98 F | RESPIRATION RATE: 17 BRPM | WEIGHT: 233 LBS | OXYGEN SATURATION: 96 % | SYSTOLIC BLOOD PRESSURE: 177 MMHG | HEIGHT: 68 IN | DIASTOLIC BLOOD PRESSURE: 89 MMHG | BODY MASS INDEX: 35.31 KG/M2

## 2020-09-18 LAB — COLONOSCOPY, EXTERNAL: NORMAL

## 2020-09-18 PROCEDURE — 76060000032 HC ANESTHESIA 0.5 TO 1 HR: Performed by: INTERNAL MEDICINE

## 2020-09-18 PROCEDURE — 88305 TISSUE EXAM BY PATHOLOGIST: CPT

## 2020-09-18 PROCEDURE — 74011000250 HC RX REV CODE- 250: Performed by: NURSE PRACTITIONER

## 2020-09-18 PROCEDURE — 77030021593 HC FCPS BIOP ENDOSC BSC -A: Performed by: INTERNAL MEDICINE

## 2020-09-18 PROCEDURE — 76040000007: Performed by: INTERNAL MEDICINE

## 2020-09-18 PROCEDURE — 74011250636 HC RX REV CODE- 250/636: Performed by: NURSE PRACTITIONER

## 2020-09-18 PROCEDURE — 2709999900 HC NON-CHARGEABLE SUPPLY: Performed by: INTERNAL MEDICINE

## 2020-09-18 RX ORDER — FENTANYL CITRATE 50 UG/ML
25-200 INJECTION, SOLUTION INTRAMUSCULAR; INTRAVENOUS
Status: DISCONTINUED | OUTPATIENT
Start: 2020-09-18 | End: 2020-09-18 | Stop reason: HOSPADM

## 2020-09-18 RX ORDER — SODIUM CHLORIDE, SODIUM LACTATE, POTASSIUM CHLORIDE, CALCIUM CHLORIDE 600; 310; 30; 20 MG/100ML; MG/100ML; MG/100ML; MG/100ML
INJECTION, SOLUTION INTRAVENOUS
Status: DISCONTINUED | OUTPATIENT
Start: 2020-09-18 | End: 2020-09-18 | Stop reason: HOSPADM

## 2020-09-18 RX ORDER — SODIUM CHLORIDE 0.9 % (FLUSH) 0.9 %
5-40 SYRINGE (ML) INJECTION EVERY 8 HOURS
Status: DISCONTINUED | OUTPATIENT
Start: 2020-09-18 | End: 2020-09-18 | Stop reason: HOSPADM

## 2020-09-18 RX ORDER — SODIUM CHLORIDE 9 MG/ML
50 INJECTION, SOLUTION INTRAVENOUS CONTINUOUS
Status: DISCONTINUED | OUTPATIENT
Start: 2020-09-18 | End: 2020-09-18 | Stop reason: HOSPADM

## 2020-09-18 RX ORDER — DEXTROMETHORPHAN/PSEUDOEPHED 2.5-7.5/.8
1.2 DROPS ORAL
Status: DISCONTINUED | OUTPATIENT
Start: 2020-09-18 | End: 2020-09-18 | Stop reason: HOSPADM

## 2020-09-18 RX ORDER — MIDAZOLAM HYDROCHLORIDE 1 MG/ML
.25-5 INJECTION, SOLUTION INTRAMUSCULAR; INTRAVENOUS
Status: DISCONTINUED | OUTPATIENT
Start: 2020-09-18 | End: 2020-09-18 | Stop reason: HOSPADM

## 2020-09-18 RX ORDER — ATROPINE SULFATE 0.1 MG/ML
0.5 INJECTION INTRAVENOUS
Status: DISCONTINUED | OUTPATIENT
Start: 2020-09-18 | End: 2020-09-18 | Stop reason: HOSPADM

## 2020-09-18 RX ORDER — EPINEPHRINE 0.1 MG/ML
1 INJECTION INTRACARDIAC; INTRAVENOUS
Status: DISCONTINUED | OUTPATIENT
Start: 2020-09-18 | End: 2020-09-18 | Stop reason: HOSPADM

## 2020-09-18 RX ORDER — SODIUM CHLORIDE 0.9 % (FLUSH) 0.9 %
5-40 SYRINGE (ML) INJECTION AS NEEDED
Status: DISCONTINUED | OUTPATIENT
Start: 2020-09-18 | End: 2020-09-18 | Stop reason: HOSPADM

## 2020-09-18 RX ORDER — FLUMAZENIL 0.1 MG/ML
0.2 INJECTION INTRAVENOUS
Status: DISCONTINUED | OUTPATIENT
Start: 2020-09-18 | End: 2020-09-18 | Stop reason: HOSPADM

## 2020-09-18 RX ORDER — LIDOCAINE HYDROCHLORIDE 20 MG/ML
INJECTION, SOLUTION EPIDURAL; INFILTRATION; INTRACAUDAL; PERINEURAL AS NEEDED
Status: DISCONTINUED | OUTPATIENT
Start: 2020-09-18 | End: 2020-09-18 | Stop reason: HOSPADM

## 2020-09-18 RX ORDER — NALOXONE HYDROCHLORIDE 0.4 MG/ML
0.4 INJECTION, SOLUTION INTRAMUSCULAR; INTRAVENOUS; SUBCUTANEOUS
Status: DISCONTINUED | OUTPATIENT
Start: 2020-09-18 | End: 2020-09-18 | Stop reason: HOSPADM

## 2020-09-18 RX ORDER — PROPOFOL 10 MG/ML
INJECTION, EMULSION INTRAVENOUS AS NEEDED
Status: DISCONTINUED | OUTPATIENT
Start: 2020-09-18 | End: 2020-09-18 | Stop reason: HOSPADM

## 2020-09-18 RX ADMIN — PROPOFOL 50 MG: 10 INJECTION, EMULSION INTRAVENOUS at 15:43

## 2020-09-18 RX ADMIN — PROPOFOL 50 MG: 10 INJECTION, EMULSION INTRAVENOUS at 15:52

## 2020-09-18 RX ADMIN — PROPOFOL 50 MG: 10 INJECTION, EMULSION INTRAVENOUS at 15:55

## 2020-09-18 RX ADMIN — PROPOFOL 50 MG: 10 INJECTION, EMULSION INTRAVENOUS at 15:46

## 2020-09-18 RX ADMIN — PROPOFOL 50 MG: 10 INJECTION, EMULSION INTRAVENOUS at 15:40

## 2020-09-18 RX ADMIN — PROPOFOL 50 MG: 10 INJECTION, EMULSION INTRAVENOUS at 15:37

## 2020-09-18 RX ADMIN — PROPOFOL 50 MG: 10 INJECTION, EMULSION INTRAVENOUS at 15:36

## 2020-09-18 RX ADMIN — PROPOFOL 100 MG: 10 INJECTION, EMULSION INTRAVENOUS at 15:35

## 2020-09-18 RX ADMIN — SODIUM CHLORIDE, SODIUM LACTATE, POTASSIUM CHLORIDE, AND CALCIUM CHLORIDE: 600; 310; 30; 20 INJECTION, SOLUTION INTRAVENOUS at 15:26

## 2020-09-18 RX ADMIN — PROPOFOL 50 MG: 10 INJECTION, EMULSION INTRAVENOUS at 15:49

## 2020-09-18 RX ADMIN — LIDOCAINE HYDROCHLORIDE 80 MG: 20 INJECTION, SOLUTION EPIDURAL; INFILTRATION; INTRACAUDAL; PERINEURAL at 15:35

## 2020-09-18 NOTE — PROCEDURES
[address] [place] [address], [address] [place] [address] (EGD) Procedure Note    Cathy Boss  1970  [id number]      Procedure: Endoscopic Gastroduodenoscopy --diagnostic, with biopsy    Indication: GERD, history of sleeve gastrectomy    Pre-operative Diagnosis: see indication above    Post-operative Diagnosis: see findings below    : Arcadio Li. Jessie Vela MD    Referring Provider:  Janice Lazcano NP      Anesthesia/Sedation:  MAC anesthesia Propofol        Procedure Details     After informed consent was obtained for the procedure, with all risks and benefits of procedure explained the patient was taken to the endoscopy suite and placed in the left lateral decubitus position. Following sequential administration of sedation as per above, the endoscope was inserted into the mouth and advanced under direct vision to second portion of the duodenum. A careful inspection was made as the gastroscope was withdrawn, including a retroflexed view of the proximal stomach; findings and interventions are described below. Findings:   Esophagus:normal  Stomach: 2 cm hiatal hernia, history of sleeve gastrectomy, no stenosis at level of angularis, normal appearing retroflexed view otherwise. Exposed suture material seen at [address] [place] [address] & [facility] junction/within hernia. 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 Duodenum         EBL: None      Complications:   None; patient tolerated the procedure well. Impression:    As above    Recommendations:  1. Follow up surgical pathology  2. Continue PPI  3. Avoid non-essential NSAID's  4. Proceed to colonoscopy    Signed By: Arcadio Li.  Jessie Vela MD     9/18/2020  4:04 PM

## 2020-09-18 NOTE — PERIOP NOTES

## 2020-09-18 NOTE — PROCEDURES
[address]  [address]        Colonoscopy Operative Report     Frank Rodgers  [id number]  1970        Procedure Type:   Colonoscopy     Indications:    constipation, family history of colon cancer           Pre-operative Diagnosis: see indication above     Post-operative Diagnosis:  See findings below     :  Haily Bobby. Anabell Vidales MD        Referring Provider:      Rafita Hagen MD        Sedation:  MAC anesthesia Propofol        Procedure Details:  After informed consent was obtained with all risks and benefits of procedure explained and preoperative exam completed, the patient was taken to the endoscopy suite and placed in the left lateral decubitus position. Upon sequential sedation as per above, a digital rectal exam was performed demonstrating internal hemorrhoids. The Olympus pediatric videocolonoscope  was inserted in the rectum and carefully advanced to the cecum, which was identified by the ileocecal valve and appendiceal orifice. The cecum was identified by the ileocecal valve and appendiceal orifice. The quality of preparation was good. The colonoscope was slowly withdrawn with careful evaluation between folds. Retroflexion in the rectum was completed .      Findings:   Rectum: small internal hemorrhoids  Sigmoid: mild diverticulosis  Descending Colon: a subepithelial lesion endoscopically consistent with a lipoma was seen in the proximal descending colon  Transverse Colon: normal  Ascending Colon: normal  Cecum: normal  Terminal Ileum: not intubated        Specimen Removed:  none     Complications: None.      EBL:  None.     Impression:     1. Mild left-sided diverticulosis  2. Small internal hemorrhoids     Recommendations:  1. Repeat colonoscopy in 5 years  2. High fiber diet education          Signed By: Haily Bobby.  Anabell Vidales MD     9/18/2020  4:08 PM

## 2020-09-18 NOTE — PERIOP NOTES
DC instructions given to pt. Understanding verbalized. Pt transported to car via wheelchair by Alexander Jolley.

## 2020-09-18 NOTE — DISCHARGE INSTRUCTIONS
[address]  [address] [facility]    EGD/COLON DISCHARGE INSTRUCTIONS    Azul Pickens  [id number]  1970    Discomfort:  Sore throat- throat lozenges or warm salt water gargle  redness at IV site- apply warm compress to area; if redness or soreness persist- contact your physician  Gaseous discomfort- walking, belching will help relieve any discomfort  You may not operate a vehicle for 12 hours  You may not engage in an occupation involving machinery or appliances for rest of today  You may not drink alcoholic beverages for at least 12 hours  Avoid making any critical decisions for at least 24 hour  DIET  You may resume your regular diet - however -  remember your colon is empty and a heavy meal will produce gas. Avoid these foods:  vegetables, fried / greasy foods, carbonated drinks    ACTIVITY  You may resume your normal daily activities   Spend the remainder of the day resting -  avoid any strenuous activity. CALL M.D. ANY SIGN OF   Increasing pain, nausea, vomiting  Abdominal distension (swelling)  New increased bleeding (oral or rectal)  Fever (chills)  Pain in chest area  Bloody discharge from nose or mouth  Shortness of breath    Follow-up Instructions:   Call Dr. Hanny Mas for any questions or problems. Telephone # [phone]    ENDOSCOPY FINDINGS:   Your endoscopy showed a hiatal hernia. The stomach anatomy shows a history of sleeve gastrectomy. Biopsies of the small intestine were taken. We will contact you about the pathology results. Your colonoscopy showed mild diverticulosis and small internal hemorrhoids. Your next colonoscopy will be due in 5 years. Please maintain a high fiber diet. Signed By: Lauren Martinez. Diana Aguirre MD     9/18/2020  4:01 PM         Learning About Coronavirus (COVID-19)  Coronavirus (COVID-19): Overview  What is coronavirus ([id number])? The coronavirus disease (COVID-19) is caused by a virus.  It is an illness that was first found in [facility], in December 2019. It has since spread worldwide. The virus can cause fever, cough, and trouble breathing. In severe cases, it can cause pneumonia and make it hard to breathe without help. It can cause death. Coronaviruses are a large group of viruses. They cause the common cold. They also cause more serious illnesses like Middle East respiratory syndrome (MERS) and severe acute respiratory syndrome (SARS). COVID-19 is caused by a novel coronavirus. That means it's a new type that has not been seen in people before. This virus spreads person-to-person through droplets from coughing and sneezing. It can also spread when you are close to someone who is infected. And it can spread when you touch something that has the virus on it, such as a doorknob or a tabletop. What can you do to protect yourself from coronavirus (COVID-19)? The best way to protect yourself from getting sick is to:  · Avoid areas where there is an outbreak. · Avoid contact with people who may be infected. · Wash your hands often with soap or alcohol-based hand sanitizers. · Avoid crowds and try to stay at least 6 feet away from other people. · Wash your hands often, especially after you cough or sneeze. Use soap and water, and scrub for at least 20 seconds. If soap and water aren't available, use an alcohol-based hand . · Avoid touching your mouth, nose, and eyes. What can you do to avoid spreading the virus to others? To help avoid spreading the virus to others:  · Cover your mouth with a tissue when you cough or sneeze. Then throw the tissue in the trash. · Use a disinfectant to clean things that you touch often. · Stay home if you are sick or have been exposed to the virus. Don't go to school, work, or public areas. And don't use public transportation. · If you are sick:  ? Leave your home only if you need to get medical care. But call the doctor's office first so they know you're coming.  And wear a face mask, if you have one.  ? If you have a face mask, wear it whenever you're around other people. It can help stop the spread of the virus when you cough or sneeze. ? Clean and disinfect your home every day. Use household  and disinfectant wipes or sprays. Take special care to clean things that you grab with your hands. These include doorknobs, remote controls, phones, and handles on your refrigerator and microwave. And don't forget countertops, tabletops, bathrooms, and computer keyboards. When to call for help  Call 911 anytime you think you may need emergency care. For example, call if:  · You have severe trouble breathing. (You can't talk at all.)  · You have constant chest pain or pressure. · You are severely dizzy or lightheaded. · You are confused or can't think clearly. · Your face and lips have a blue color. · You pass out (lose consciousness) or are very hard to wake up. Call your doctor now if you develop symptoms such as:  · Shortness of breath. · Fever. · Cough. If you need to get care, call ahead to the doctor's office for instructions before you go. Make sure you wear a face mask, if you have one, to prevent exposing other people to the virus. Where can you get the latest information? The following health organizations are tracking and studying this virus. Their websites contain the most up-to-date information. Jacqualine Severin also learn what to do if you think you may have been exposed to the virus. · U.S. Centers for Disease Control and Prevention (CDC): The CDC provides updated news about the disease and travel advice. The website also tells you how to prevent the spread of infection. www.cdc.gov  · World Health Organization [facility]): WHO offers information about the virus outbreaks. WHO also has travel advice. www.who.int  Current as of: April 1, 2020               Content Version: 12.4  © 3001-2825 Healthwise, [employer]orporated.    Care instructions adapted under license by your healthcare professional. If you have questions about a medical condition or this instruction, always ask your healthcare professional. [place] [address] any warranty or liability for your use of this information. none

## 2020-09-18 NOTE — H&P
[address]  [address] [facility], [facility] [address]      History and Physical       NAME:  Magdy Benton   :   1970   MRN:   [id number]             History of Present Illness:  Patient is a 45 y.o. who is seen for GERD and constipation. PMH:  Past Medical History:   Diagnosis Date    Bilateral chronic knee pain 10/25/2012    Bipolar 1 disorder (HCC)     Chronic bronchitis with COPD (chronic obstructive pulmonary disease) (HCC)     Chronic low back pain 10/25/2012    slipped disc, herniated disc, and scoliosis per patient    Chronic pain     Constipation     Depression     Dyspepsia and other specified disorders of function of stomach     GERD (gastroesophageal reflux disease)     Hepatitis A     Hyperlipidemia     Hypertension     Morbid obesity ([facility] 10/25/2012    Unspecified sleep apnea     HOME O2 - NC/uses oxygen maybe 3 nights a week. Pt unsure of liters.  Uterine cancer ([facility]     treated with surgery only       PSH:  Past Surgical History:   Procedure Laterality Date    COLONOSCOPY N/A 2016    COLONOSCOPY performed by Kady Ramos. Loreta Grubbs MD at [address] COLONOSCOPY Left 2018    COLONOSCOPY performed by Kady Ramos. Loreta Grubbs MD at [facility] ENDOSCOPY    HX CHOLECYSTECTOMY      HX GASTRECTOMY  14    lap sleeve gastrectomy by dr Shawna Delacruz HX GYN      fibroids removed - non cancerous - vaginal area    HX HYSTERECTOMY  11/13/15    HX TUBAL LIGATION         Allergies: Allergies   Allergen Reactions    Codeine Swelling     Tongue swells;  Patient has tolerated Percocet and Morphine without adverse effects  Oxymorphone and oxycodone are home meds    Ambien [Zolpidem] Other (comments)     \"Sleep driving\"    Compazine [Prochlorperazine Edisylate] Swelling     Tongue swells    Lisinopril Swelling     Tongue swells      Peanut Nausea and Vomiting     PEANUTS IN SHELL - SEVERE N&V       Home Medications:  Prior to Admission Medications   Prescriptions Last Dose Informant Patient Reported? Taking? Cane jeannie Unknown at Unknown time  No No   Sig: For use as needed   Dexilant 60 mg CpDB capsule (delayed release) 9/18/2020 at Unknown time  Yes Yes   MULTI-VITAMIN WITH IRON PO 9/11/2020 at Unknown time  Yes Yes   Sig: Take 1 Tab by mouth daily. QUEtiapine (SEROQUEL) 300 mg tablet 9/17/2020 at Unknown time  No Yes   Sig: Take 1 Tab by mouth nightly. Patient taking differently: Take 400 mg by mouth nightly. 2 tab   Stiolto Respimat 2.5-2.5 mcg/actuation inhaler Unknown at Unknown time  Yes No   acetaminophen (TYLENOL) 500 mg tablet Unknown at Unknown time  No No   Sig: Take 1 Tab by mouth every six (6) hours as needed for Pain. albuterol (PROVENTIL HFA, VENTOLIN HFA, PROAIR HFA) 90 mcg/actuation inhaler 9/18/2020 at Unknown time  No Yes   Sig: Take 1-2 Puffs by inhalation every four (4) hours as needed for Wheezing. amLODIPine (NORVASC) 10 mg tablet 9/17/2020 at Unknown time  No Yes   Sig: Take 1 Tab by mouth daily. atorvastatin (LIPITOR) 20 mg tablet 9/17/2020 at Unknown time  No Yes   Sig: Take 1 Tab by mouth daily. divalproex DR (DEPAKOTE) 250 mg tablet 9/17/2020 at Unknown time  Yes Yes   Sig: daily. loratadine (CLARITIN) 10 mg tablet 9/17/2020 at Unknown time  No Yes   Sig: Take 1 Tab by mouth daily. meloxicam (MOBIC) 7.5 mg tablet 9/11/2020 at Unknown time  No Yes   Sig: TAKE 1 TABLET BY MOUTH DAILY AS NEEDED FOR PAIN   triamterene-hydroCHLOROthiazide (DYAZIDE) 37.5-25 mg per capsule 9/18/2020 at Unknown time  No Yes   Sig: Take 1 Cap by mouth daily.       Facility-Administered Medications: None       Hospital Medications:  Current Facility-Administered Medications   Medication Dose Route Frequency    0.9% sodium chloride infusion  50 mL/hr IntraVENous CONTINUOUS    sodium chloride (NS) flush 5-40 mL  5-40 mL IntraVENous Q8H    sodium chloride (NS) flush 5-40 mL  5-40 mL IntraVENous PRN    midazolam (VERSED) injection 0.25-5 mg  0.25-5 mg IntraVENous Multiple    fentaNYL citrate (PF) injection  mcg   mcg IntraVENous Multiple    naloxone (NARCAN) injection 0.4 mg  0.4 mg IntraVENous Multiple    flumazeniL (ROMAZICON) 0.1 mg/mL injection 0.2 mg  0.2 mg IntraVENous Multiple    simethicone (MYLICON) 31IX/8.2MF oral drops 80 mg  1.2 mL Oral Multiple    atropine injection 0.5 mg  0.5 mg IntraVENous ONCE PRN    EPINEPHrine (ADRENALIN) 0.1 mg/mL syringe 1 mg  1 mg Endoscopically ONCE PRN       Social History:  Social History     Tobacco Use    Smoking status: Current Every Day Smoker     Packs/day: 0.25     Years: 20.00     Pack years: 5.00     Types: Cigarettes    Smokeless tobacco: Never Used   Substance Use Topics    Alcohol use: Not Currently     Alcohol/week: 0.0 standard drinks     Comment: 1-2 DRINKS PER YEAR       Family History:  Family History   Problem Relation Age of Onset    Heart Disease Mother     Psychiatric Disorder Mother     Hypertension Mother     Diabetes Brother     Heart Disease Brother     Hypertension Brother     Alzheimer Father     Other Brother         BRAIN ANEURYSM             Review of Systems:      Constitutional: negative fever, negative chills, negative weight loss  Eyes:   negative visual changes  ENT:   negative sore throat, tongue or lip swelling  Respiratory:  negative cough, negative dyspnea  Cards:  negative for chest pain, palpitations, lower extremity edema  GI:   See HPI  :  negative for frequency, dysuria  Integument:  negative for rash and pruritus  Heme:  negative for easy bruising and gum/nose bleeding  Musculoskel: negative for myalgias,  back pain and muscle weakness  Neuro: negative for headaches, dizziness, vertigo  Psych:  negative for feelings of anxiety, depression       Objective:     Patient Vitals for the past 8 hrs:   BP Temp Pulse Resp SpO2 Height Weight   09/18/20 1435 127/87 98.6 °F (37 °C) 80 22 95 % 5' 8\" (1.727 m) 105.7 kg (233 lb) No intake/output data recorded. No intake/output data recorded. EXAM:     NEURO-a&o   HEENT-wnl   LUNGS-clear    COR-regular rate and rhythym     ABD-soft , no tenderness, no rebound, good bs     EXT-no edema     Data Review     No results for input(s): WBC, HGB, HCT, PLT, HGBEXT, HCTEXT, PLTEXT in the last 72 hours. No results for input(s): NA, K, CL, CO2, BUN, CREA, GLU, PHOS, CA in the last 72 hours. No results for input(s): AP, TBIL, TP, ALB, GLOB, GGT, AML, LPSE in the last 72 hours. No lab exists for component: SGOT, GPT, AMYP, HLPSE  No results for input(s): INR, PTP, APTT, INREXT in the last 72 hours. Assessment:     · GERD  · constipation     Patient Active Problem List   Diagnosis Code    Morbid obesity ([facility] E66.01    Hypertension I10    Depression F32.9    GERD (gastroesophageal reflux disease) K21.9    Arthritis M19.90    Chronic low back pain M54.5, G89.29    Bilateral chronic knee pain M25.561, M25.562, G89.29    Simple chronic bronchitis (HCC) J41.0    Tobacco use Z72.0    Other hyperlipidemia E78.49    Bipolar disorder, mixed ([facility] F31.60     Plan:   · The patient was counseled at length about the risks of cruz Covid-19 in the jaiden-operative and post-operative states including the recovery window of their procedure. The patient was made aware that cruz Covid-19 after a surgical procedure may worsen their prognosis for recovering from the virus and lend to a higher morbidity and or mortality risk. The patient was given the options of postponing their procedure. All of the risks, benefits, and alternatives were discussed. The patient does wish to proceed with the procedure. · Endoscopic procedure with MAC     Signed By: Gertrudis Alford.  Ben Juares MD     9/18/2020  3:32 PM

## 2020-09-30 NOTE — ANESTHESIA POSTPROCEDURE EVALUATION
Procedure(s):  COLONOSCOPY AND EGD  ESOPHAGOGASTRODUODENOSCOPY (EGD)  ESOPHAGOGASTRODUODENAL (EGD) BIOPSY. No value filed.     <BSHSIANPOST>    INITIAL Post-op Vital signs:   Vitals Value Taken Time   /89 9/18/2020  4:19 PM   Temp 36.7 °C (98 °F) 9/18/2020  4:08 PM   Pulse 77 9/18/2020  4:19 PM   Resp 17 9/18/2020  4:19 PM   SpO2 96 % 9/18/2020  4:19 PM

## 2020-09-30 NOTE — ANESTHESIA PREPROCEDURE EVALUATION
Relevant Problems   No relevant active problems       Anesthetic History   No history of anesthetic complications            Review of Systems / Medical History  Patient summary reviewed, nursing notes reviewed and pertinent labs reviewed    Pulmonary  Within defined limits  COPD    Sleep apnea           Neuro/Psych   Within defined limits           Cardiovascular  Within defined limits  Hypertension                   GI/Hepatic/Renal  Within defined limits   GERD      Liver disease     Endo/Other  Within defined limits      Obesity     Other Findings              Physical Exam    Airway  Mallampati: II  TM Distance: > 6 cm  Neck ROM: normal range of motion   Mouth opening: Normal     Cardiovascular  Regular rate and rhythm,  S1 and S2 normal,  no murmur, click, rub, or gallop             Dental  No notable dental hx       Pulmonary  Breath sounds clear to auscultation               Abdominal  GI exam deferred       Other Findings            Anesthetic Plan    ASA: 3  Anesthesia type: MAC            Anesthetic plan and risks discussed with: Patient

## 2020-10-16 ENCOUNTER — TRANSCRIBE ORDER (OUTPATIENT)
Dept: SCHEDULING | Age: 50
End: 2020-10-16

## 2020-10-16 DIAGNOSIS — R11.2 NAUSEA & VOMITING: ICD-10-CM

## 2020-10-16 DIAGNOSIS — K21.9 ACID REFLUX: Primary | ICD-10-CM

## 2020-10-16 DIAGNOSIS — K59.00 CONSTIPATION: ICD-10-CM

## 2020-10-19 ENCOUNTER — TRANSCRIBE ORDER (OUTPATIENT)
Dept: SCHEDULING | Age: 50
End: 2020-10-19

## 2020-10-19 DIAGNOSIS — R11.2 NAUSEA & VOMITING: ICD-10-CM

## 2020-10-19 DIAGNOSIS — K59.00 CONSTIPATED: ICD-10-CM

## 2020-10-19 DIAGNOSIS — K21.9 ACID REFLUX: Primary | ICD-10-CM

## 2020-10-27 RX ORDER — LORATADINE 10 MG/1
TABLET ORAL
Qty: 30 TAB | Refills: 5 | Status: SHIPPED | OUTPATIENT
Start: 2020-10-27 | End: 2021-03-11 | Stop reason: SDUPTHER

## 2020-12-15 DIAGNOSIS — G89.4 CHRONIC PAIN SYNDROME: ICD-10-CM

## 2020-12-19 RX ORDER — MELOXICAM 7.5 MG/1
TABLET ORAL
Qty: 30 TAB | Refills: 0 | Status: SHIPPED | OUTPATIENT
Start: 2020-12-19

## 2021-02-18 DIAGNOSIS — I10 ESSENTIAL HYPERTENSION: ICD-10-CM

## 2021-02-18 NOTE — TELEPHONE ENCOUNTER
Last Visit: VV 6/2/20 MD Ignacio Richardson  Next Appointment: Not scheduled  Previous Refill Encounter(s): 2/12/20 90 + 3    Requested Prescriptions     Pending Prescriptions Disp Refills    triamterene-hydroCHLOROthiazide (DYAZIDE) 37.5-25 mg per capsule 90 Cap 3     Sig: Take 1 Cap by mouth daily.

## 2021-02-19 RX ORDER — TRIAMTERENE AND HYDROCHLOROTHIAZIDE 37.5; 25 MG/1; MG/1
1 CAPSULE ORAL DAILY
Qty: 90 CAP | Refills: 3 | Status: SHIPPED | OUTPATIENT
Start: 2021-02-19 | End: 2021-04-21 | Stop reason: ALTCHOICE

## 2021-03-11 DIAGNOSIS — G89.4 CHRONIC PAIN SYNDROME: ICD-10-CM

## 2021-03-11 RX ORDER — MELOXICAM 7.5 MG/1
7.5 TABLET ORAL
Qty: 30 TAB | Refills: 0 | OUTPATIENT
Start: 2021-03-11

## 2021-03-11 RX ORDER — LORATADINE 10 MG/1
10 TABLET ORAL DAILY
Qty: 90 TAB | Refills: 1 | Status: SHIPPED | OUTPATIENT
Start: 2021-03-11 | End: 2021-10-13

## 2021-03-11 NOTE — TELEPHONE ENCOUNTER
Last visit 06/02/2020 Virtual visit MD Baljeet Kessler   Next appointment 3 months (09/2020) - Nothing scheduled   Previous refill encounter(s)   12/19/2020 Mobic #30     Requested Prescriptions     Pending Prescriptions Disp Refills    meloxicam (MOBIC) 7.5 mg tablet 30 Tab 0     Sig: Take 1 Tab by mouth daily as needed for Pain.

## 2021-03-11 NOTE — TELEPHONE ENCOUNTER
Last visit 06/02/2020 Virtual visit MD Chavis   Next appointment 3 months (09/2020) - Nothing scheduled   Previous refill encounter(s)   10/27/2020 Claritin #30 with 5 refills     Requested Prescriptions     Pending Prescriptions Disp Refills   • loratadine (CLARITIN) 10 mg tablet 90 Tab 1     Sig: Take 1 Tab by mouth daily.

## 2021-03-29 DIAGNOSIS — E78.2 MIXED HYPERLIPIDEMIA: ICD-10-CM

## 2021-03-31 RX ORDER — ATORVASTATIN CALCIUM 20 MG/1
TABLET, FILM COATED ORAL
Qty: 30 TAB | Refills: 11 | Status: SHIPPED | OUTPATIENT
Start: 2021-03-31 | End: 2022-05-09

## 2021-04-19 NOTE — TELEPHONE ENCOUNTER
SUDHAKAR Fabian,    Patient call stating she needed refills of loratidine and ibuprofen 800mg    I contacted patient as she has loratidine available at the pharmacy. I also advised the ibuprofen was discontinued. She stated she has appt with you on Wednesday and will ask about that then. ALSO, she stated he changed psychiatrists and does not have appt until June. She is asking for rx for Quetiapine to hold her until then. She will run out tomorrow night! She takes 400mg tab - takes 2 tabs at bedtime. (noted at last visit)    Last Visit: VV 6/2/20 MD [facility]  Next Appointment: 4/21/21 SUDHAKAR Fabian  Previous Refill Encounter(s): 4/11/19 (another provider)    Requested Prescriptions     Pending Prescriptions Disp Refills    QUEtiapine (SEROquel) 400 mg tablet 60 Tab 1     Sig: Take 2 Tabs by mouth nightly.

## 2021-04-20 RX ORDER — QUETIAPINE FUMARATE 400 MG/1
800 TABLET, FILM COATED ORAL
Qty: 60 TAB | Refills: 1 | Status: SHIPPED | OUTPATIENT
Start: 2021-04-20

## 2021-04-21 ENCOUNTER — OFFICE VISIT (OUTPATIENT)
Dept: FAMILY MEDICINE CLINIC | Age: 51
End: 2021-04-21
Payer: MEDICAID

## 2021-04-21 VITALS
OXYGEN SATURATION: 96 % | SYSTOLIC BLOOD PRESSURE: 111 MMHG | HEART RATE: 81 BPM | WEIGHT: 206 LBS | RESPIRATION RATE: 16 BRPM | HEIGHT: 68 IN | TEMPERATURE: 97.3 F | DIASTOLIC BLOOD PRESSURE: 77 MMHG | BODY MASS INDEX: 31.22 KG/M2

## 2021-04-21 DIAGNOSIS — L25.9 CONTACT DERMATITIS, UNSPECIFIED CONTACT DERMATITIS TYPE, UNSPECIFIED TRIGGER: ICD-10-CM

## 2021-04-21 DIAGNOSIS — I10 ESSENTIAL HYPERTENSION: Primary | ICD-10-CM

## 2021-04-21 DIAGNOSIS — Z12.31 SCREENING MAMMOGRAM, ENCOUNTER FOR: ICD-10-CM

## 2021-04-21 DIAGNOSIS — R29.898 WEAKNESS OF BOTH LOWER EXTREMITIES: ICD-10-CM

## 2021-04-21 DIAGNOSIS — M54.50 LUMBAR BACK PAIN: ICD-10-CM

## 2021-04-21 PROCEDURE — 99214 OFFICE O/P EST MOD 30 MIN: CPT | Performed by: NURSE PRACTITIONER

## 2021-04-21 RX ORDER — TRIAMCINOLONE ACETONIDE 1 MG/G
CREAM TOPICAL 2 TIMES DAILY
Qty: 15 G | Refills: 0 | Status: SHIPPED | OUTPATIENT
Start: 2021-04-21 | End: 2021-05-27

## 2021-04-21 RX ORDER — HYDROCHLOROTHIAZIDE 25 MG/1
25 TABLET ORAL DAILY
Qty: 30 TAB | Refills: 1 | Status: SHIPPED | OUTPATIENT
Start: 2021-04-21 | End: 2021-05-25

## 2021-04-21 NOTE — PROGRESS NOTES
Assessment/Plan:     Diagnoses and all orders for this visit:    1. Essential hypertension  -     hydroCHLOROthiazide (HYDRODIURIL) 25 mg tablet; Take 1 Tab by mouth daily. Discontinue triamterene. Restart HCTZ as above once daily. Return in 4 weeks or sooner as needed. 2. Screening mammogram, encounter for  -     [facility] MAMMO BI SCREENING INCL CAD; Future    3. Contact dermatitis, unspecified contact dermatitis type, unspecified trigger  -     triamcinolone acetonide (KENALOG) 0.1 % topical cream; Apply  to affected area two (2) times a day. use thin layer  -     REFERRAL TO DERMATOLOGY  Treatment as above. Follow-up with dermatology for additional evaluation. 4. Lumbar back pain  -     REFERRAL TO PHYSICAL THERAPY    5. Weakness of both lower extremities  -     REFERRAL TO PHYSICAL THERAPY  Declines interventional pain management. Refer to physical therapy as above. Follow-up and Dispositions    · Return in about 4 weeks (around 5/19/2021) for Complete Physical.         Discussed expected course/resolution/complications of diagnosis in detail with patient. Medication risks/benefits/costs/interactions/alternatives discussed with patient. Pt was given after visit summary which includes diagnoses, current medications & vitals. Pt expressed understanding with the diagnosis and plan          Subjective:      Nancy Colunga is a 45 y.o. female who presents for had concerns including Medication Refill (follow up ) and Arm Pain (RT arm is in a lot of pain and arm has a bruising up the entire arm it looks like ). Reports a rash on the right arm for 3 months. Previous evaluation at [facility] for right arm pain with negative xray. Attempted NSAIDS with some improvement. Rash is pruritic. Has attempted topical otcs without improvement. Hypertension  Patient is here for follow-up of hypertension.   She indicates that she is feeling well and denies any symptoms referable to her hypertension, including chest pain, palpitations, dyspnea, orthopnea, or peripheral edema. Patient has these side effects of medication: lightheadedness due to low blood pressure so she has started taking Dyazide every other day. She is not exercising and is not adherent to low salt diet. Blood pressure is well controlled at home. Use of agents associated with hypertension: none. History of renal disease: no.  Cardiovascular risk factors: smoking/ tobacco exposure, obesity, sedentary life style, hypertension, stress, post-menopausal.      She has establish with psychiatry however she missed an appointment and was unable to get refills. She has asked for a bridge from our office which was refilled yesterday. Reports a history of chronic lumbar back pain with associated radiculopathy. She has previously established with pain management however declines interventional pain therapy. She is currently using a cane for ambulation she is taking NSAIDs as needed sparingly. She continues to have pain associated with her lumbar back. Patient Active Problem List   Diagnosis Code    Morbid obesity ([facility] [address] E66.01    Hypertension I10    Depression F32.9    GERD (gastroesophageal reflux disease) K21.9    Arthritis M19.90    Chronic low back pain M54.5, G89.29    Bilateral chronic knee pain M25.561, M25.562, G89.29    Simple chronic bronchitis (HCC) J41.0    Tobacco use Z72.0    Other hyperlipidemia E78.49    Bipolar disorder, mixed (HCC) F31.60       Current Outpatient Medications   Medication Sig Dispense Refill    triamcinolone acetonide (KENALOG) 0.1 % topical cream Apply  to affected area two (2) times a day. use thin layer 15 g 0    hydroCHLOROthiazide (HYDRODIURIL) 25 mg tablet Take 1 Tab by mouth daily. 30 Tab 1    QUEtiapine (SEROquel) 400 mg tablet Take 2 Tabs by mouth nightly. 60 Tab 1    loratadine (CLARITIN) 10 mg tablet Take 1 Tab by mouth daily.  90 Tab 1    meloxicam (MOBIC) 7.5 mg tablet TAKE 1 TABLET BY MOUTH EVERY DAY AS NEEDED FOR PAIN 30 Tab 0    amLODIPine (NORVASC) 10 mg tablet TAKE 1 TABLET BY MOUTH EVERY DAY 90 Tab 3    Dexilant 60 mg CpDB capsule (delayed release)       Stiolto Respimat 2.5-2.5 mcg/actuation inhaler       albuterol (PROVENTIL HFA, VENTOLIN HFA, PROAIR HFA) 90 mcg/actuation inhaler Take 1-2 Puffs by inhalation every four (4) hours as needed for Wheezing. 1 Inhaler 5    divalproex DR (DEPAKOTE) 250 mg tablet daily.  MULTI-VITAMIN WITH IRON PO Take 1 Tab by mouth daily. Jabari Kemps jeannie For use as needed 1 Device prn    atorvastatin (LIPITOR) 20 mg tablet TAKE 1 TABLET BY MOUTH EVERY DAY 30 Tab 11    acetaminophen (TYLENOL) 500 mg tablet Take 1 Tab by mouth every six (6) hours as needed for Pain. 30 Tab 1       Allergies   Allergen Reactions    Codeine Swelling     Tongue swells; Patient has tolerated Percocet and Morphine without adverse effects  Oxymorphone and oxycodone are home meds    Ambien [Zolpidem] Other (comments)     \"Sleep driving\"    Compazine [Prochlorperazine Edisylate] Swelling     Tongue swells    Lisinopril Swelling     Tongue swells      Peanut Nausea and Vomiting     PEANUTS IN SHELL - SEVERE N&V       ROS:   Review of Systems   Constitutional: Positive for malaise/fatigue. Eyes: Negative for blurred vision. Respiratory: Negative for shortness of breath. Cardiovascular: Negative for chest pain. Musculoskeletal: Positive for back pain and joint pain. Skin: Positive for itching and rash. Neurological: Positive for weakness. Objective:     Visit Vitals  /77 (BP 1 Location: Left upper arm, BP Patient Position: Sitting, BP Cuff Size: Large adult)   Pulse 81   Temp 97.3 °F (36.3 °C) (Temporal)   Resp 16   Ht 5' 8\" (1.727 m)   Wt 206 lb (93.4 kg)   LMP 08/04/2013   SpO2 96%   BMI 31.32 kg/m²       Vitals and Nurse Documentation reviewed. Physical Exam  Constitutional:       General: She is not in acute distress.      Appearance: She is obese. Cardiovascular:      Heart sounds: S1 normal and S2 normal. No murmur. No friction rub. No gallop. Pulmonary:      Effort: No respiratory distress. Breath sounds: Normal breath sounds. Musculoskeletal:      Right elbow: She exhibits normal range of motion, no swelling, no effusion and no deformity. Tenderness found. Lateral epicondyle tenderness noted. Lumbar back: She exhibits decreased range of motion, pain and spasm. She exhibits no swelling and no deformity. Comments: Ambulatory with cane for assistance in office. Skin:     General: Skin is warm and dry.           Psychiatric:         Mood and Affect: Mood and affect normal.
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
## 2021-04-29 DIAGNOSIS — G89.4 CHRONIC PAIN SYNDROME: ICD-10-CM

## 2021-04-29 NOTE — TELEPHONE ENCOUNTER
Last Visit: 4/21/21 SUDHAKAR Fabian  Next Appointment: 5/25/21 SUDHAKAR Fabian  Previous Refill Enco/unter(s): 12/19/20 30     Requested Prescriptions     Pending Prescriptions Disp Refills    meloxicam (MOBIC) 7.5 mg tablet 30 Tab 0     Sig: TAKE 1 TABLET BY MOUTH EVERY DAY AS NEEDED FOR PAIN

## 2021-05-03 RX ORDER — MELOXICAM 7.5 MG/1
TABLET ORAL
Qty: 30 TAB | Refills: 0 | OUTPATIENT
Start: 2021-05-03

## 2021-05-06 ENCOUNTER — HOSPITAL ENCOUNTER (OUTPATIENT)
Dept: MAMMOGRAPHY | Age: 51
Discharge: HOME | End: 2021-05-06
Attending: NURSE PRACTITIONER
Payer: MEDICAID

## 2021-05-06 DIAGNOSIS — Z12.31 SCREENING MAMMOGRAM, ENCOUNTER FOR: ICD-10-CM

## 2021-05-06 PROCEDURE — 77063 BREAST TOMOSYNTHESIS BI: CPT

## 2021-05-10 ENCOUNTER — TRANSCRIBE ORDER (OUTPATIENT)
Dept: GENERAL RADIOLOGY | Age: 51
End: 2021-05-10

## 2021-05-10 DIAGNOSIS — R92.8 ABNORMAL MAMMOGRAM: Primary | ICD-10-CM

## 2021-05-11 ENCOUNTER — HOSPITAL ENCOUNTER (OUTPATIENT)
Dept: MAMMOGRAPHY | Age: 51
Discharge: HOME | End: 2021-05-11
Attending: NURSE PRACTITIONER
Payer: MEDICAID

## 2021-05-11 DIAGNOSIS — R92.8 ABNORMAL MAMMOGRAM: ICD-10-CM

## 2021-05-11 PROCEDURE — 77061 BREAST TOMOSYNTHESIS UNI: CPT

## 2021-05-11 PROCEDURE — 76642 ULTRASOUND BREAST LIMITED: CPT

## 2021-05-24 DIAGNOSIS — I10 ESSENTIAL HYPERTENSION: ICD-10-CM

## 2021-05-25 RX ORDER — HYDROCHLOROTHIAZIDE 25 MG/1
TABLET ORAL
Qty: 30 TABLET | Refills: 1 | Status: SHIPPED | OUTPATIENT
Start: 2021-05-25 | End: 2021-06-22

## 2021-05-26 DIAGNOSIS — L25.9 CONTACT DERMATITIS, UNSPECIFIED CONTACT DERMATITIS TYPE, UNSPECIFIED TRIGGER: ICD-10-CM

## 2021-05-27 RX ORDER — TRIAMCINOLONE ACETONIDE 1 MG/G
CREAM TOPICAL
Qty: 30 G | Refills: 1 | Status: SHIPPED | OUTPATIENT
Start: 2021-05-27 | End: 2021-07-19

## 2021-06-18 RX ORDER — QUETIAPINE FUMARATE 400 MG/1
800 TABLET, FILM COATED ORAL
Qty: 60 TABLET | Refills: 1 | OUTPATIENT
Start: 2021-06-18

## 2021-06-18 NOTE — TELEPHONE ENCOUNTER
SUDHAKAR Fabian,    Patient call requesting refill of seroquel. She stated you have refilled for her in the past.  Thanks, Joshua Salas    Last Visit: 4/21/21 SUDHAKAR Fabian (shows appt yesterday and no show 5/25/21)  Next Appointment: Not scheduled  Previous Refill Encounter(s): 4/20/21 60 + 1    Requested Prescriptions     Pending Prescriptions Disp Refills    QUEtiapine (SEROquel) 400 mg tablet 60 Tablet 1     Sig: Take 2 Tablets by mouth nightly.

## 2021-06-21 DIAGNOSIS — I10 ESSENTIAL HYPERTENSION: ICD-10-CM

## 2021-06-21 RX ORDER — QUETIAPINE FUMARATE 400 MG/1
800 TABLET, FILM COATED ORAL
Qty: 60 TABLET | Refills: 1 | Status: CANCELLED | OUTPATIENT
Start: 2021-06-21

## 2021-06-21 NOTE — TELEPHONE ENCOUNTER
Patient called for refill      . Requested Prescriptions     Pending Prescriptions Disp Refills    QUEtiapine (SEROquel) 400 mg tablet 60 Tablet 1     Sig: Take 2 Tablets by mouth nightly.      Best call back #[phone]

## 2021-06-22 RX ORDER — HYDROCHLOROTHIAZIDE 25 MG/1
TABLET ORAL
Qty: 30 TABLET | Refills: 1 | Status: SHIPPED | OUTPATIENT
Start: 2021-06-22 | End: 2022-05-09 | Stop reason: ALTCHOICE

## 2021-06-23 NOTE — TELEPHONE ENCOUNTER
Call placed to patient no answer left message that patient is due for an appt for medication refills to please call the office to scheduled.

## 2021-07-18 DIAGNOSIS — L25.9 CONTACT DERMATITIS, UNSPECIFIED CONTACT DERMATITIS TYPE, UNSPECIFIED TRIGGER: ICD-10-CM

## 2021-07-19 RX ORDER — TRIAMCINOLONE ACETONIDE 1 MG/G
CREAM TOPICAL
Qty: 30 G | Refills: 1 | Status: SHIPPED | OUTPATIENT
Start: 2021-07-19 | End: 2021-10-13

## 2021-07-19 RX ORDER — QUETIAPINE FUMARATE 400 MG/1
800 TABLET, FILM COATED ORAL
Qty: 60 TABLET | Refills: 1 | OUTPATIENT
Start: 2021-07-19

## 2021-07-19 NOTE — TELEPHONE ENCOUNTER
Please call pharmacy. Patient has been referred to psychiatry. I am not filling the seroquel.   This is the 4th request.

## 2021-07-20 NOTE — TELEPHONE ENCOUNTER
[facility] pharmacy notified that our provider Grace Lal NP has referred patient to psychiatry in reference to medication Seroquel 400 mg tablet and will not be filling medication.   Mary Marte LPN

## 2021-08-11 DIAGNOSIS — I10 ESSENTIAL HYPERTENSION: ICD-10-CM

## 2021-08-12 RX ORDER — AMLODIPINE BESYLATE 10 MG/1
TABLET ORAL
Qty: 90 TABLET | Refills: 3 | Status: SHIPPED | OUTPATIENT
Start: 2021-08-12

## 2021-08-20 ENCOUNTER — TELEPHONE (OUTPATIENT)
Dept: FAMILY MEDICINE CLINIC | Age: 51
End: 2021-08-20

## 2021-08-20 NOTE — TELEPHONE ENCOUNTER
Call from [employer] to see if we had gotten fax for incontinent supplies. I left  for [employer] that they can fax to my fax [phone]. I also LM that pt will need OV for form to be completed.   Last form completed 3/25/21

## 2021-08-23 ENCOUNTER — VIRTUAL VISIT (OUTPATIENT)
Dept: FAMILY MEDICINE CLINIC | Age: 51
End: 2021-08-23
Payer: MEDICAID
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
## 2021-08-23 DIAGNOSIS — M54.2 NECK PAIN: ICD-10-CM

## 2021-08-23 DIAGNOSIS — R07.89 STERNAL PAIN: ICD-10-CM

## 2021-08-23 PROCEDURE — 99213 OFFICE O/P EST LOW 20 MIN: CPT | Performed by: NURSE PRACTITIONER

## 2021-08-23 RX ORDER — GABAPENTIN 600 MG/1
600 TABLET ORAL 3 TIMES DAILY
COMMUNITY
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
## 2021-10-11 DIAGNOSIS — L25.9 CONTACT DERMATITIS, UNSPECIFIED CONTACT DERMATITIS TYPE, UNSPECIFIED TRIGGER: ICD-10-CM

## 2021-10-13 RX ORDER — TRIAMCINOLONE ACETONIDE 1 MG/G
CREAM TOPICAL
Qty: 30 G | Refills: 1 | Status: SHIPPED | OUTPATIENT
Start: 2021-10-13

## 2021-10-13 RX ORDER — LORATADINE 10 MG/1
TABLET ORAL
Qty: 90 TABLET | Refills: 1 | Status: SHIPPED | OUTPATIENT
Start: 2021-10-13

## 2021-10-14 NOTE — TELEPHONE ENCOUNTER
----- Message from Ortiz Nolen sent at 6/18/2021 12:23 PM EDT -----  Regarding: SUDHAKAR Fabian/Refill  Medication Refill    Caller (if not patient):      Relationship of caller (if not patient):      Best contact number(s):[phone]      Name of medication and dosage if known:\"Seroquel\" 400 mg  10 pills      Is patient out of this medication (yes/no):yes      Pharmacy name:[facility] on [address] listed in chart? (yes/no):  Pharmacy phone number:      Details to clarify the request:Pt stated she is out of medication, and her mail order Rx have not arrived and requested a refill  called to [facility] pharmacy today until her Rx arrives.       Ortiz Nolen Bleeding that does not stop/Swelling that gets worse/Pain not relieved by Medications/Fever greater than (need to indicate Fahrenheit or Celsius)/Wound/Surgical Site with redness, or foul smelling discharge or pus/Numbness, tingling, color or temperature change to extremity/Nausea and vomiting that does not stop/Unable to urinate/Excessive diarrhea/Inability to tolerate liquids or foods/Increased irritability or sluggishness

## 2022-03-01 NOTE — PATIENT INSTRUCTIONS
High Blood Pressure: Care Instructions  Your Care Instructions    If your blood pressure is usually above 140/90, you have high blood pressure, or hypertension. That means the top number is 140 or higher or the bottom number is 90 or higher, or both. Despite what a lot of people think, high blood pressure usually doesn't cause headaches or make you feel dizzy or lightheaded. It usually has no symptoms. But it does increase your risk for heart attack, stroke, and kidney or eye damage. The higher your blood pressure, the more your risk increases. Your doctor will give you a goal for your blood pressure. Your goal will be based on your health and your age. An example of a goal is to keep your blood pressure below 140/90. Lifestyle changes, such as eating healthy and being active, are always important to help lower blood pressure. You might also take medicine to reach your blood pressure goal.  Follow-up care is a key part of your treatment and safety. Be sure to make and go to all appointments, and call your doctor if you are having problems. It's also a good idea to know your test results and keep a list of the medicines you take. How can you care for yourself at home? Medical treatment  · If you stop taking your medicine, your blood pressure will go back up. You may take one or more types of medicine to lower your blood pressure. Be safe with medicines. Take your medicine exactly as prescribed. Call your doctor if you think you are having a problem with your medicine. · Talk to your doctor before you start taking aspirin every day. Aspirin can help certain people lower their risk of a heart attack or stroke. But taking aspirin isn't right for everyone, because it can cause serious bleeding. · See your doctor regularly. You may need to see the doctor more often at first or until your blood pressure comes down.   · If you are taking blood pressure medicine, talk to your doctor before you take decongestants [facility]  [address] [place] [id number]  Phone: [phone]  Fax: Karen Lentz MD         March 1, 2022     Patient: Brisa Browne   YOB: 1963   Date of Visit: 3/1/2022       To Whom It May Concern: It is my medical opinion that Nayana Pedro requires a disability parking placard for the following reasons:  He has limited walking ability due to an orthopedic condition. Duration of need: 5 years    If you have any questions or concerns, please don't hesitate to call.     Sincerely,        Christine Elam MD or anti-inflammatory medicine, such as ibuprofen. Some of these medicines can raise blood pressure. · Learn how to check your blood pressure at home. Lifestyle changes  · Stay at a healthy weight. This is especially important if you put on weight around the waist. Losing even 10 pounds can help you lower your blood pressure. · If your doctor recommends it, get more exercise. Walking is a good choice. Bit by bit, increase the amount you walk every day. Try for at least 30 minutes on most days of the week. You also may want to swim, bike, or do other activities. · Avoid or limit alcohol. Talk to your doctor about whether you can drink any alcohol. · Try to limit how much sodium you eat to less than 2,300 milligrams (mg) a day. Your doctor may ask you to try to eat less than 1,500 mg a day. · Eat plenty of fruits (such as bananas and oranges), vegetables, legumes, whole grains, and low-fat dairy products. · Lower the amount of saturated fat in your diet. Saturated fat is found in animal products such as milk, cheese, and meat. Limiting these foods may help you lose weight and also lower your risk for heart disease. · Do not smoke. Smoking increases your risk for heart attack and stroke. If you need help quitting, talk to your doctor about stop-smoking programs and medicines. These can increase your chances of quitting for good. When should you call for help? Call 911 anytime you think you may need emergency care. This may mean having symptoms that suggest that your blood pressure is causing a serious heart or blood vessel problem. Your blood pressure may be over 180/110. ? For example, call 911 if:  ? · You have symptoms of a heart attack. These may include:  ¨ Chest pain or pressure, or a strange feeling in the chest.  ¨ Sweating. ¨ Shortness of breath. ¨ Nausea or vomiting.   ¨ Pain, pressure, or a strange feeling in the back, neck, jaw, or upper belly or in one or both shoulders or arms.  ¨ Lightheadedness or sudden weakness. ¨ A fast or irregular heartbeat. ? · You have symptoms of a stroke. These may include:  ¨ Sudden numbness, tingling, weakness, or loss of movement in your face, arm, or leg, especially on only one side of your body. ¨ Sudden vision changes. ¨ Sudden trouble speaking. ¨ Sudden confusion or trouble understanding simple statements. ¨ Sudden problems with walking or balance. ¨ A sudden, severe headache that is different from past headaches. ? · You have severe back or belly pain. ?Do not wait until your blood pressure comes down on its own. Get help right away. ?Call your doctor now or seek immediate care if:  ? · Your blood pressure is much higher than normal (such as 180/110 or higher), but you don't have symptoms. ? · You think high blood pressure is causing symptoms, such as:  ¨ Severe headache. ¨ Blurry vision. ? Watch closely for changes in your health, and be sure to contact your doctor if:  ? · Your blood pressure measures 140/90 or higher at least 2 times. That means the top number is 140 or higher or the bottom number is 90 or higher, or both. ? · You think you may be having side effects from your blood pressure medicine. ? · Your blood pressure is usually normal, but it goes above normal at least 2 times. Where can you learn more? Go to http://gretchen-blair.info/. Enter R764 in the search box to learn more about \"High Blood Pressure: Care Instructions. \"  Current as of: September 21, 2016  Content Version: 11.4  © 5801-1100 [employer]. Care instructions adapted under license by [employer] (which disclaims liability or warranty for this information). If you have questions about a medical condition or this instruction, always ask your healthcare professional. [place] [address] any warranty or liability for your use of this information.

## 2022-03-19 PROBLEM — E78.49 OTHER HYPERLIPIDEMIA: Status: ACTIVE | Noted: 2018-06-22

## 2022-03-19 PROBLEM — J41.0 SIMPLE CHRONIC BRONCHITIS (HCC): Status: ACTIVE | Noted: 2018-06-22

## 2022-03-19 PROBLEM — Z72.0 TOBACCO USE: Status: ACTIVE | Noted: 2018-06-22

## 2022-03-19 PROBLEM — F31.60 BIPOLAR DISORDER, MIXED (HCC): Status: ACTIVE | Noted: 2018-06-22

## 2022-06-22 NOTE — DISCHARGE INSTRUCTIONS
Gastroesophageal Reflux Disease (GERD): Care Instructions  Your Care Instructions    Gastroesophageal reflux disease (GERD) is the backward flow of stomach acid into the esophagus. The esophagus is the tube that leads from your throat to your stomach. A one-way valve prevents the stomach acid from moving up into this tube. When you have GERD, this valve does not close tightly enough. If you have mild GERD symptoms including heartburn, you may be able to control the problem with antacids or over-the-counter medicine. Changing your diet, losing weight, and making other lifestyle changes can also help reduce symptoms. Follow-up care is a key part of your treatment and safety. Be sure to make and go to all appointments, and call your doctor if you are having problems. It's also a good idea to know your test results and keep a list of the medicines you take. How can you care for yourself at home? · Take your medicines exactly as prescribed. Call your doctor if you think you are having a problem with your medicine. · Your doctor may recommend over-the-counter medicine. For mild or occasional indigestion, antacids, such as Tums, Gaviscon, Mylanta, or Maalox, may help. Your doctor also may recommend over-the-counter acid reducers, such as Pepcid AC, Tagamet HB, Zantac 75, or Prilosec. Read and follow all instructions on the label. If you use these medicines often, talk with your doctor. · Change your eating habits. ¨ It's best to eat several small meals instead of two or three large meals. ¨ After you eat, wait 2 to 3 hours before you lie down. ¨ Chocolate, mint, and alcohol can make GERD worse. ¨ Spicy foods, foods that have a lot of acid (like tomatoes and oranges), and coffee can make GERD symptoms worse in some people. If your symptoms are worse after you eat a certain food, you may want to stop eating that food to see if your symptoms get better.   · Do not smoke or chew tobacco. Smoking can make GERD worse. If you need help quitting, talk to your doctor about stop-smoking programs and medicines. These can increase your chances of quitting for good. · If you have GERD symptoms at night, raise the head of your bed 6 to 8 inches by putting the frame on blocks or placing a foam wedge under the head of your mattress. (Adding extra pillows does not work.)  · Do not wear tight clothing around your middle. · Lose weight if you need to. Losing just 5 to 10 pounds can help. When should you call for help? Call your doctor now or seek immediate medical care if:  ? · You have new or different belly pain. ? · Your stools are black and tarlike or have streaks of blood. ? Watch closely for changes in your health, and be sure to contact your doctor if:  ? · Your symptoms have not improved after 2 days. ? · Food seems to catch in your throat or chest.   Where can you learn more? Go to http://gretchen-blair.info/. Enter I686 in the search box to learn more about \"Gastroesophageal Reflux Disease (GERD): Care Instructions. \"  Current as of: May 12, 2017  Content Version: 11.4  © 0860-0016 [employer]. Care instructions adapted under license by [employer] (which disclaims liability or warranty for this information). If you have questions about a medical condition or this instruction, always ask your healthcare professional. [place] [address] any warranty or liability for your use of this information. Learning About High Blood Pressure  What is high blood pressure? Blood pressure is a measure of how hard the blood pushes against the walls of your arteries. It's normal for blood pressure to go up and down throughout the day, but if it stays up, you have high blood pressure. Another name for high blood pressure is hypertension. Two numbers tell you your blood pressure. The first number is the systolic pressure.  It shows how hard the blood pushes when your heart is pumping. The second number is the diastolic pressure. It shows how hard the blood pushes between heartbeats, when your heart is relaxed and filling with blood. A blood pressure of less than 120/80 (say \"120 over 80\") is ideal for an adult. High blood pressure is 140/90 or higher. You have high blood pressure if your top number is 140 or higher or your bottom number is 90 or higher, or both. Many people fall into the category in between, called prehypertension. People with prehypertension need to make lifestyle changes to bring their blood pressure down and help prevent or delay high blood pressure. What happens when you have high blood pressure? · Blood flows through your arteries with too much force. Over time, this damages the walls of your arteries. But you can't feel it. High blood pressure usually doesn't cause symptoms. · Fat and calcium start to build up in your arteries. This buildup is called plaque. Plaque makes your arteries narrower and stiffer. Blood can't flow through them as easily. · This lack of good blood flow starts to damage some of the organs in your body. This can lead to problems such as coronary artery disease and heart attack, heart failure, stroke, kidney failure, and eye damage. How can you prevent high blood pressure? · Stay at a healthy weight. · Try to limit how much sodium you eat to less than 2,300 milligrams (mg) a day. If you limit your sodium to 1,500 mg a day, you can lower your blood pressure even more. ¨ Buy foods that are labeled \"unsalted,\" \"sodium-free,\" or \"low-sodium. \" Foods labeled \"reduced-sodium\" and \"light sodium\" may still have too much sodium. ¨ Flavor your food with garlic, lemon juice, onion, vinegar, herbs, and spices instead of salt. Do not use soy sauce, steak sauce, onion salt, garlic salt, mustard, or ketchup on your food. ¨ Use less salt (or none) when recipes call for it.  You can often use half the salt a recipe calls for without losing flavor. · Be physically active. Get at least 30 minutes of exercise on most days of the week. Walking is a good choice. You also may want to do other activities, such as running, swimming, cycling, or playing tennis or team sports. · Limit alcohol to 2 drinks a day for men and 1 drink a day for women. · Eat plenty of fruits, vegetables, and low-fat dairy products. Eat less saturated and total fats. How is high blood pressure treated? · Your doctor will suggest making lifestyle changes. For example, your doctor may ask you to eat healthy foods, quit smoking, lose extra weight, and be more active. · If lifestyle changes don't help enough or your blood pressure is very high, you will have to take medicine every day. Follow-up care is a key part of your treatment and safety. Be sure to make and go to all appointments, and call your doctor if you are having problems. It's also a good idea to know your test results and keep a list of the medicines you take. Where can you learn more? Go to http://gretchen-blair.info/. Enter P501 in the search box to learn more about \"Learning About High Blood Pressure. \"  Current as of: September 21, 2016  Content Version: 11.4  © 0214-5604 [employer]. Care instructions adapted under license by [employer] (which disclaims liability or warranty for this information). If you have questions about a medical condition or this instruction, always ask your healthcare professional. [place] [address] any warranty or liability for your use of this information. Pinched Nerve in the Neck: Care Instructions  Your Care Instructions  A pinched nerve in the neck happens when a vertebra or disc in the upper part of your spine is damaged. This damage can happen because of an injury. Or it can just happen with age. The changes caused by the damage may put pressure on a nearby nerve root, pinching it.  This causes symptoms such as sharp pain in your neck, shoulder, arm, hand, or back. You may also have tingling or numbness. Sometimes it makes your arm weaker. The symptoms are usually worse when you turn your head or strain your neck. For many people, the symptoms get better over time and finally go away. Early treatment usually includes medicines for pain and swelling. Sometimes physical therapy and special exercises may help. Follow-up care is a key part of your treatment and safety. Be sure to make and go to all appointments, and call your doctor if you are having problems. It's also a good idea to know your test results and keep a list of the medicines you take. How can you care for yourself at home? · Be safe with medicines. Read and follow all instructions on the label. ¨ If the doctor gave you a prescription medicine for pain, take it as prescribed. ¨ If you are not taking a prescription pain medicine, ask your doctor if you can take an over-the-counter medicine. · Try using a heating pad on a low or medium setting for 15 to 20 minutes every 2 or 3 hours. Try a warm shower in place of one session with the heating pad. You can also buy single-use heat wraps that last up to 8 hours. · You can also try an ice pack for 10 to 15 minutes every 2 to 3 hours. There isn't strong evidence that either heat or ice will help. But you can try them to see if they help you. · Don't spend too long in one position. Take short breaks to move around and change positions. · Wear a seat belt and shoulder harness when you are in a car. · Sleep with a pillow under your head and neck that keeps your neck straight. · If you were given a neck brace (cervical collar) to limit neck motion, wear it as instructed for as many days as your doctor tells you to. Do not wear it longer than you were told to. Wearing a brace for too long can lead to neck stiffness and can weaken the neck muscles.   · Follow your doctor's instructions for gentle neck-stretching exercises. · Do not smoke. Smoking can slow healing of your discs. If you need help quitting, talk to your doctor about stop-smoking programs and medicines. These can increase your chances of quitting for good. · Avoid strenuous work or exercise until your doctor says it is okay. When should you call for help? Call 911 anytime you think you may need emergency care. For example, call if:  ? · You are unable to move an arm or a leg at all. ?Call your doctor now or seek immediate medical care if:  ? · You have new or worse symptoms in your arms, legs, chest, belly, or buttocks. Symptoms may include:  ¨ Numbness or tingling. ¨ Weakness. ¨ Pain. ? · You lose bladder or bowel control. ? Watch closely for changes in your health, and be sure to contact your doctor if:  ? · You are not getting better as expected. Where can you learn more? Go to http://gretchen-blair.info/. Enter R483 in the search box to learn more about \"Pinched Nerve in the Neck: Care Instructions. \"  Current as of: March 21, 2017  Content Version: 11.4  © 6103-2579 [employer]. Care instructions adapted under license by [employer] (which disclaims liability or warranty for this information). If you have questions about a medical condition or this instruction, always ask your healthcare professional. [address] any warranty or liability for your use of this information. stated

## 2022-06-27 NOTE — TELEPHONE ENCOUNTER
Outbound call to patient. Patient states that she will not be going through insurance Dr. Gerald Levi office. Patient request to fax over referral to that office. Writer verbalized understanding and will fax referral to Dr. Gerald Levi office. Referral printed and faxed to Dr. Maddy Corral office on 11/5/2019 @ 11:38 am to [phone] Confirmation received. Attempting to assess pt, obtain PIV and lab work, pt verbally aggressive with staff, yelling "I don't want to be touched", threatening to staff, "if you touch me your going to see what's going to happen". Pt refusing vital signs, refusing CT. Pt laying in bed, requesting tv brought closer to bed and more blankets. MD advised.

## 2022-10-20 NOTE — ED NOTES
Pt presents to ED ambulatory with multiple chief complaints. Pt reports chest pain, posterior right leg pain, left arm pain and tingling, and abdominal pain. Pt denies taking medications for relief of symptoms. Pt requesting refills on her medications. Pt is alert and oriented x 4, RR even and unlabored, skin is warm and dry. Assessment completed and pt updated on plan of care. Emergency Department Nursing Plan of Care       The Nursing Plan of Care is developed from the Nursing assessment and Emergency Department Attending provider initial evaluation. The plan of care may be reviewed in the ED Provider note.     The Plan of Care was developed with the following considerations:   Patient / Family readiness to learn indicated by:verbalized understanding  Persons(s) to be included in education: patient  Barriers to Learning/Limitations:No    Signed     Franco King RN    2/18/2018   11:39 PM Principal Discharge DX:	Abdominal pain   1

## 2024-03-20 NOTE — TELEPHONE ENCOUNTER
Ov if no improvement. Submitted PA for ALPRAZolam ER 2MG er tablets  Via CM Key: [id number] STATUS: PENDING.    Follow up done daily; if no decision with in three days we will refax.  If another three days goes by with no decision will call the insurance for status.

## 2024-06-12 ENCOUNTER — TELEPHONE (OUTPATIENT)
Age: 54
End: 2024-06-12

## 2024-06-12 NOTE — TELEPHONE ENCOUNTER
Called patient 06/12/24  to screen and inform them about [employer] recommendations that  patients  who have undergone Sleeve Gastrectomy > 3 years post op have routine surveillance upper endoscopy to evaluate for any esophageal changes   LM with Pt VM:  yes    Surgeon:  Dr. June   Date of Surgery:  8/18/2024      No f/u with us for several years, had an EGD by Dr. Seo in 2018 for GERD that showed esophagitis.     DIPAK German

## 2025-02-16 NOTE — TELEPHONE ENCOUNTER
----- Message from [place] Aust[place] sent at 3/20/2020  2:13 PM EDT -----  Regarding: SUDHAKAR Fabian/ telephone  Contact: [phone] (if not patient): n/a  Relationship of caller (if not patient): n/a  Best contact number(s): [phone]  Name of medication and dosage if known: Albuterol  Is patient out of this medication (yes/no): yes  Pharmacy name: [address] listed in chart? (yes/no): yes  Pharmacy phone number: n/a  Date of last visit: 2/18/20  Details to clarify the request:  Would like to be placed back on Albuterol as it worked better for her. Requested Prescriptions     Pending Prescriptions Disp Refills    albuterol (PROVENTIL HFA, VENTOLIN HFA, PROAIR HFA) 90 mcg/actuation inhaler 1 Inhaler 5     Sig: Take 1-2 Puffs by inhalation every four (4) hours as needed for Wheezing.
Yes - the patient is able to be screened

## (undated) DEVICE — BW-412T DISP COMBO CLEANING BRUSH: Brand: SINGLE USE COMBINATION CLEANING BRUSH

## (undated) DEVICE — SOLIDIFIER FLUID 3000 CC ABSORB

## (undated) DEVICE — SET ADMIN 16ML TBNG L100IN 2 Y INJ SITE IV PIGGY BK DISP

## (undated) DEVICE — 1200 GUARD II KIT W/5MM TUBE W/O VAC TUBE: Brand: GUARDIAN

## (undated) DEVICE — FORCEPS BX L240CM JAW DIA2.8MM L CAP W/ NDL MIC MESH TOOTH

## (undated) DEVICE — CANN NASAL O2 CAPNOGRAPHY AD -- FILTERLINE

## (undated) DEVICE — CONTAINER SPEC 20 ML LID NEUT BUFF FORMALIN 10 % POLYPR STS

## (undated) DEVICE — SYRINGE MED 20ML STD CLR PLAS LUERLOCK TIP N CTRL DISP

## (undated) DEVICE — Z DISCONTINUED NO SUB IDED SET EXTN W/ 4 W STPCOCK M SPIN LOK 36IN

## (undated) DEVICE — TUBING HYDR IRR --

## (undated) DEVICE — AIRLIFE™ U/CONNECT-IT OXYGEN TUBING 7 FEET (2.1 M) CRUSH-RESISTANT OXYGEN TUBING, VINYL TIPPED: Brand: AIRLIFE™

## (undated) DEVICE — BITE BLK ENDOSCP AD 54FR GRN POLYETH ENDOSCP W STRP SLD

## (undated) DEVICE — NEEDLE HYPO 18GA L1.5IN PNK S STL HUB POLYPR SHLD REG BVL

## (undated) DEVICE — BAG SPEC BIOHZD LF 2MIL 6X10IN -- CONVERT TO ITEM 357326

## (undated) DEVICE — Z DISCONTINUED USE 2751540 TUBING IRRIG L10IN DISP PMP ENDOGATOR

## (undated) DEVICE — KIT IV STRT W CHLORAPREP PD 1ML

## (undated) DEVICE — CONNECTOR TBNG AUX H2O JET DISP FOR OLY 160/180 SER

## (undated) DEVICE — KENDALL RADIOLUCENT FOAM MONITORING ELECTRODE -RECTANGULAR SHAPE: Brand: KENDALL

## (undated) DEVICE — QUILTED PREMIUM COMFORT UNDERPAD,EXTRA HEAVY: Brand: WINGS

## (undated) DEVICE — ENDO CARRY-ON PROCEDURE KIT INCLUDES ENZYMATIC SPONGE, GAUZE, BIOHAZARD LABEL, TRAY, LUBRICANT, DIRTY SCOPE LABEL, WATER LABEL, TRAY, DRAWSTRING PAD, AND DEFENDO 4-PIECE KIT.: Brand: ENDO CARRY-ON PROCEDURE KIT

## (undated) DEVICE — TRAP SURG QUAD PARABOLA SLOT DSGN SFTY SCRN TRAPEASE

## (undated) DEVICE — CATH IV AUTOGRD BC BLU 22GA 25 -- INSYTE

## (undated) DEVICE — BAG BELONG PT PERS CLEAR HANDL